# Patient Record
Sex: MALE | Race: WHITE | NOT HISPANIC OR LATINO | URBAN - METROPOLITAN AREA
[De-identification: names, ages, dates, MRNs, and addresses within clinical notes are randomized per-mention and may not be internally consistent; named-entity substitution may affect disease eponyms.]

---

## 2018-07-06 ENCOUNTER — INPATIENT (INPATIENT)
Facility: HOSPITAL | Age: 83
LOS: 4 days | Discharge: HOME | DRG: 699 | End: 2018-07-11
Attending: HOSPITALIST | Admitting: HOSPITALIST
Payer: MEDICARE

## 2018-07-06 VITALS — HEIGHT: 63 IN | WEIGHT: 175.93 LBS

## 2018-07-06 DIAGNOSIS — R33.9 RETENTION OF URINE, UNSPECIFIED: ICD-10-CM

## 2018-07-06 DIAGNOSIS — N20.0 CALCULUS OF KIDNEY: ICD-10-CM

## 2018-07-06 DIAGNOSIS — Z98.89 OTHER SPECIFIED POSTPROCEDURAL STATES: Chronic | ICD-10-CM

## 2018-07-06 DIAGNOSIS — Z90.49 ACQUIRED ABSENCE OF OTHER SPECIFIED PARTS OF DIGESTIVE TRACT: Chronic | ICD-10-CM

## 2018-07-06 DIAGNOSIS — D63.8 ANEMIA IN OTHER CHRONIC DISEASES CLASSIFIED ELSEWHERE: ICD-10-CM

## 2018-07-06 DIAGNOSIS — N13.9 OBSTRUCTIVE AND REFLUX UROPATHY, UNSPECIFIED: ICD-10-CM

## 2018-07-06 LAB
ALBUMIN SERPL ELPH-MCNC: 4.3 G/DL — SIGNIFICANT CHANGE UP (ref 3.3–5.2)
ALP SERPL-CCNC: 81 U/L — SIGNIFICANT CHANGE UP (ref 40–120)
ALT FLD-CCNC: 10 U/L — SIGNIFICANT CHANGE UP
ANION GAP SERPL CALC-SCNC: 18 MMOL/L — HIGH (ref 5–17)
APPEARANCE UR: ABNORMAL
APTT BLD: 27 SEC — LOW (ref 27.5–37.4)
AST SERPL-CCNC: 16 U/L — SIGNIFICANT CHANGE UP
BACTERIA # UR AUTO: ABNORMAL
BILIRUB SERPL-MCNC: 0.3 MG/DL — LOW (ref 0.4–2)
BILIRUB UR-MCNC: NEGATIVE — SIGNIFICANT CHANGE UP
BUN SERPL-MCNC: 47 MG/DL — HIGH (ref 8–20)
CALCIUM SERPL-MCNC: 9.2 MG/DL — SIGNIFICANT CHANGE UP (ref 8.6–10.2)
CHLORIDE SERPL-SCNC: 99 MMOL/L — SIGNIFICANT CHANGE UP (ref 98–107)
CO2 SERPL-SCNC: 18 MMOL/L — LOW (ref 22–29)
COLOR SPEC: ABNORMAL
COMMENT - URINE: SIGNIFICANT CHANGE UP
CREAT SERPL-MCNC: 1.67 MG/DL — HIGH (ref 0.5–1.3)
DIFF PNL FLD: ABNORMAL
EPI CELLS # UR: SIGNIFICANT CHANGE UP
GLUCOSE SERPL-MCNC: 130 MG/DL — HIGH (ref 70–115)
GLUCOSE UR QL: NEGATIVE MG/DL — SIGNIFICANT CHANGE UP
HCT VFR BLD CALC: 30.3 % — LOW (ref 42–52)
HGB BLD-MCNC: 10 G/DL — LOW (ref 14–18)
HYALINE CASTS # UR AUTO: ABNORMAL /LPF
INR BLD: 0.97 RATIO — SIGNIFICANT CHANGE UP (ref 0.88–1.16)
KETONES UR-MCNC: NEGATIVE — SIGNIFICANT CHANGE UP
LACTATE BLDV-MCNC: 1.6 MMOL/L — SIGNIFICANT CHANGE UP (ref 0.5–2)
LEUKOCYTE ESTERASE UR-ACNC: ABNORMAL
LYMPHOCYTES # BLD AUTO: 6 % — LOW (ref 20–55)
MAGNESIUM SERPL-MCNC: 2.4 MG/DL — SIGNIFICANT CHANGE UP (ref 1.6–2.6)
MCHC RBC-ENTMCNC: 29.8 PG — SIGNIFICANT CHANGE UP (ref 27–31)
MCHC RBC-ENTMCNC: 33 G/DL — SIGNIFICANT CHANGE UP (ref 32–36)
MCV RBC AUTO: 90.2 FL — SIGNIFICANT CHANGE UP (ref 80–94)
MONOCYTES NFR BLD AUTO: 4 % — SIGNIFICANT CHANGE UP (ref 3–10)
NEUTROPHILS NFR BLD AUTO: 90 % — HIGH (ref 37–73)
NITRITE UR-MCNC: POSITIVE
PH UR: 5 — SIGNIFICANT CHANGE UP (ref 5–8)
PLAT MORPH BLD: NORMAL — SIGNIFICANT CHANGE UP
PLATELET # BLD AUTO: 201 K/UL — SIGNIFICANT CHANGE UP (ref 150–400)
POTASSIUM SERPL-MCNC: 3.9 MMOL/L — SIGNIFICANT CHANGE UP (ref 3.5–5.3)
POTASSIUM SERPL-SCNC: 3.9 MMOL/L — SIGNIFICANT CHANGE UP (ref 3.5–5.3)
PROT SERPL-MCNC: 7.1 G/DL — SIGNIFICANT CHANGE UP (ref 6.6–8.7)
PROT UR-MCNC: 100 MG/DL
PROTHROM AB SERPL-ACNC: 10.7 SEC — SIGNIFICANT CHANGE UP (ref 9.8–12.7)
RBC # BLD: 3.36 M/UL — LOW (ref 4.6–6.2)
RBC # FLD: 12.5 % — SIGNIFICANT CHANGE UP (ref 11–15.6)
RBC BLD AUTO: NORMAL — SIGNIFICANT CHANGE UP
RBC CASTS # UR COMP ASSIST: >50 /HPF (ref 0–4)
SODIUM SERPL-SCNC: 135 MMOL/L — SIGNIFICANT CHANGE UP (ref 135–145)
SP GR SPEC: 1.01 — SIGNIFICANT CHANGE UP (ref 1.01–1.02)
TROPONIN T SERPL-MCNC: 0.05 NG/ML — SIGNIFICANT CHANGE UP (ref 0–0.06)
UROBILINOGEN FLD QL: NEGATIVE MG/DL — SIGNIFICANT CHANGE UP
WBC # BLD: 11.3 K/UL — HIGH (ref 4.8–10.8)
WBC # FLD AUTO: 11.3 K/UL — HIGH (ref 4.8–10.8)
WBC UR QL: SIGNIFICANT CHANGE UP

## 2018-07-06 PROCEDURE — 71250 CT THORAX DX C-: CPT | Mod: 26

## 2018-07-06 PROCEDURE — 74176 CT ABD & PELVIS W/O CONTRAST: CPT | Mod: 26

## 2018-07-06 PROCEDURE — 99223 1ST HOSP IP/OBS HIGH 75: CPT | Mod: AI

## 2018-07-06 PROCEDURE — 99285 EMERGENCY DEPT VISIT HI MDM: CPT

## 2018-07-06 RX ORDER — DIAZEPAM 5 MG
5 TABLET ORAL
Qty: 0 | Refills: 0 | Status: DISCONTINUED | OUTPATIENT
Start: 2018-07-06 | End: 2018-07-11

## 2018-07-06 RX ORDER — PRIMIDONE 250 MG/1
50 TABLET ORAL DAILY
Qty: 0 | Refills: 0 | Status: DISCONTINUED | OUTPATIENT
Start: 2018-07-06 | End: 2018-07-11

## 2018-07-06 RX ORDER — OXYCODONE AND ACETAMINOPHEN 5; 325 MG/1; MG/1
1 TABLET ORAL EVERY 6 HOURS
Qty: 0 | Refills: 0 | Status: DISCONTINUED | OUTPATIENT
Start: 2018-07-06 | End: 2018-07-11

## 2018-07-06 RX ORDER — OMEGA-3 ACID ETHYL ESTERS 1 G
2 CAPSULE ORAL
Qty: 0 | Refills: 0 | Status: DISCONTINUED | OUTPATIENT
Start: 2018-07-06 | End: 2018-07-11

## 2018-07-06 RX ORDER — CEFTRIAXONE 500 MG/1
1 INJECTION, POWDER, FOR SOLUTION INTRAMUSCULAR; INTRAVENOUS ONCE
Qty: 0 | Refills: 0 | Status: COMPLETED | OUTPATIENT
Start: 2018-07-06 | End: 2018-07-06

## 2018-07-06 RX ORDER — PANTOPRAZOLE SODIUM 20 MG/1
40 TABLET, DELAYED RELEASE ORAL
Qty: 0 | Refills: 0 | Status: DISCONTINUED | OUTPATIENT
Start: 2018-07-06 | End: 2018-07-11

## 2018-07-06 RX ORDER — CEFTRIAXONE 500 MG/1
1 INJECTION, POWDER, FOR SOLUTION INTRAMUSCULAR; INTRAVENOUS EVERY 24 HOURS
Qty: 0 | Refills: 0 | Status: DISCONTINUED | OUTPATIENT
Start: 2018-07-07 | End: 2018-07-11

## 2018-07-06 RX ORDER — TAMSULOSIN HYDROCHLORIDE 0.4 MG/1
0.4 CAPSULE ORAL AT BEDTIME
Qty: 0 | Refills: 0 | Status: DISCONTINUED | OUTPATIENT
Start: 2018-07-06 | End: 2018-07-11

## 2018-07-06 RX ORDER — SODIUM CHLORIDE 9 MG/ML
1000 INJECTION INTRAMUSCULAR; INTRAVENOUS; SUBCUTANEOUS
Qty: 0 | Refills: 0 | Status: COMPLETED | OUTPATIENT
Start: 2018-07-06 | End: 2018-07-06

## 2018-07-06 RX ORDER — MEMANTINE HYDROCHLORIDE 10 MG/1
10 TABLET ORAL DAILY
Qty: 0 | Refills: 0 | Status: DISCONTINUED | OUTPATIENT
Start: 2018-07-06 | End: 2018-07-11

## 2018-07-06 RX ORDER — FINASTERIDE 5 MG/1
5 TABLET, FILM COATED ORAL DAILY
Qty: 0 | Refills: 0 | Status: DISCONTINUED | OUTPATIENT
Start: 2018-07-06 | End: 2018-07-09

## 2018-07-06 RX ORDER — DONEPEZIL HYDROCHLORIDE 10 MG/1
10 TABLET, FILM COATED ORAL AT BEDTIME
Qty: 0 | Refills: 0 | Status: DISCONTINUED | OUTPATIENT
Start: 2018-07-06 | End: 2018-07-11

## 2018-07-06 RX ORDER — ZOLPIDEM TARTRATE 10 MG/1
5 TABLET ORAL AT BEDTIME
Qty: 0 | Refills: 0 | Status: DISCONTINUED | OUTPATIENT
Start: 2018-07-06 | End: 2018-07-11

## 2018-07-06 RX ORDER — ENOXAPARIN SODIUM 100 MG/ML
40 INJECTION SUBCUTANEOUS EVERY 24 HOURS
Qty: 0 | Refills: 0 | Status: DISCONTINUED | OUTPATIENT
Start: 2018-07-06 | End: 2018-07-11

## 2018-07-06 RX ADMIN — TAMSULOSIN HYDROCHLORIDE 0.4 MILLIGRAM(S): 0.4 CAPSULE ORAL at 23:00

## 2018-07-06 RX ADMIN — Medication 2 GRAM(S): at 17:33

## 2018-07-06 RX ADMIN — OXYCODONE AND ACETAMINOPHEN 1 TABLET(S): 5; 325 TABLET ORAL at 23:59

## 2018-07-06 RX ADMIN — DONEPEZIL HYDROCHLORIDE 10 MILLIGRAM(S): 10 TABLET, FILM COATED ORAL at 23:00

## 2018-07-06 RX ADMIN — OXYCODONE AND ACETAMINOPHEN 1 TABLET(S): 5; 325 TABLET ORAL at 13:20

## 2018-07-06 RX ADMIN — Medication 375 MILLIGRAM(S): at 18:03

## 2018-07-06 RX ADMIN — SODIUM CHLORIDE 125 MILLILITER(S): 9 INJECTION INTRAMUSCULAR; INTRAVENOUS; SUBCUTANEOUS at 04:27

## 2018-07-06 RX ADMIN — OXYCODONE AND ACETAMINOPHEN 1 TABLET(S): 5; 325 TABLET ORAL at 13:50

## 2018-07-06 RX ADMIN — Medication 5 MILLIGRAM(S): at 17:33

## 2018-07-06 RX ADMIN — PRIMIDONE 50 MILLIGRAM(S): 250 TABLET ORAL at 13:20

## 2018-07-06 RX ADMIN — CEFTRIAXONE 100 GRAM(S): 500 INJECTION, POWDER, FOR SOLUTION INTRAMUSCULAR; INTRAVENOUS at 07:26

## 2018-07-06 RX ADMIN — ENOXAPARIN SODIUM 40 MILLIGRAM(S): 100 INJECTION SUBCUTANEOUS at 13:20

## 2018-07-06 RX ADMIN — ZOLPIDEM TARTRATE 5 MILLIGRAM(S): 10 TABLET ORAL at 22:59

## 2018-07-06 RX ADMIN — MEMANTINE HYDROCHLORIDE 10 MILLIGRAM(S): 10 TABLET ORAL at 13:20

## 2018-07-06 RX ADMIN — OXYCODONE AND ACETAMINOPHEN 1 TABLET(S): 5; 325 TABLET ORAL at 22:59

## 2018-07-06 RX ADMIN — Medication 375 MILLIGRAM(S): at 17:33

## 2018-07-06 RX ADMIN — PANTOPRAZOLE SODIUM 40 MILLIGRAM(S): 20 TABLET, DELAYED RELEASE ORAL at 13:21

## 2018-07-06 NOTE — H&P ADULT - PROBLEM SELECTOR PLAN 4
-possibly due to renal failure  -send iron, ferritin, tibc  -can consider EPO if no iron deficiency on labs

## 2018-07-06 NOTE — H&P ADULT - HISTORY OF PRESENT ILLNESS
is an 85 y/o male with PMHx Alzheimer's disease, HTN, prostate CA presents to the ER c/o inability to urinate since last night. He reports also back pain, for which he receives medications from pain managmeent. On admission, he was found to be in acute renal failure and he has hematuria in the rob bag, after rob was placed successfully. I suspect he has urinary retention secondary to BPH and I've started flomax and finasteride. I will obtain a urology consult. He also has a UTI, for which he was started on rocephin, urine culture is pending. I'm also obtaining a CT scan of his abdomen, as he does have a hx of stones in his past, and I'm concerned about his hematuria.     Both him and his wife understand the plan of care and are in full agreement with the admission.

## 2018-07-06 NOTE — ED PROVIDER NOTE - CARE PLAN
Principal Discharge DX:	Obstructive uropathy  Secondary Diagnosis:	Urinary retention  Secondary Diagnosis:	Renal insufficiency

## 2018-07-06 NOTE — ED ADULT NURSE NOTE - OBJECTIVE STATEMENT
patient states can not urinate, abdomen distended, has not urinated since 2100, pain, retaining urine

## 2018-07-06 NOTE — ED PROVIDER NOTE - PMH
Alzheimers disease    [place] (hard of hearing)    HTN (hypertension)    Hypertension    Kidney stone    Kidney stones    Prostate cancer    Sleep apnea

## 2018-07-06 NOTE — H&P ADULT - NSHPPHYSICALEXAM_GEN_ALL_CORE
Gen: NAD, comfortable, speaking full sentences, awake, alert, lying back in bed, pleasant  HEENT: dry MM  CVS: s1S2 RRR  PULM: good breath sounds  ABD: soft, nontender, no cystitis, no rebound, no guarding  EXTREM: no edema  NEURO: intact  PSYCH: pleasant  : rob in place, draining reddish-tinged urine

## 2018-07-06 NOTE — ED ADULT NURSE NOTE - PMH
Alzheimers disease    [place] (hard of hearing)    HTN (hypertension)    Hypertension    Kidney stone    Kidney stones    Prostate cancer    Sleep apnea

## 2018-07-06 NOTE — ED PROVIDER NOTE - OBJECTIVE STATEMENT
85 y/o male with PMHx Alzheimer's disease, HTN, prostate CA presents to the ED with c/o urinary retention, onset 2300 yesterday. Pt reports back pain, however, pt sees pain management for back pain. Per pt received steroid shots, last received 2 weeks ago. Pt denies fever, chills, n/v, and any other acute symptoms and complaints at this time.  Nephrologist: Dr. [facility]

## 2018-07-06 NOTE — ED PROVIDER NOTE - PHYSICAL EXAMINATION
Constitutional : Appears comfortably, talking in full sentences  Head :NC AT , no swelling  Eyes :eomi, no swelling  Ears: hearing aids in bilatearl ears   Mouth :mm moist,  Neck : supple, trachea in midline  Chest :[place] air entry, symm chest expansion, no distress  Heart :S1 S2 distant  Abdomen :abd soft, non tender  Genitalia no swelling, post Munroe noted, more than 500cc of dark colored urine  Musc/Skel :ext no swelling, no deformity, no spine tenderness, distal pulses present  Neuro  :AAO 3 no focal deficits

## 2018-07-06 NOTE — H&P ADULT - PMH
Alzheimers disease    [place] (hard of hearing)    HTN (hypertension)    Hypertension    Kidney stone    Kidney stones    Prostate cancer    Sleep apnea

## 2018-07-06 NOTE — ED PROVIDER NOTE - NS ED ROS FT
no weight change, no fever or chills  no rash, no bruises  no visual changes no eye discharge  no cough cold or congestion,   no sob, no chest pain  no orthopnea, no pnd  no abd pain, no n/v/d  no hematuria, +change in urinary habits  +back pain, no joint pain, no deformity  no headache, no paresthesia

## 2018-07-06 NOTE — ED PROVIDER NOTE - PSH
H/O neck surgery    History of appendectomy    History of appendectomy    History of back surgery    History of back surgery  cervical surgery 1992  History of back surgery  2012  History of cholecystectomy

## 2018-07-07 DIAGNOSIS — N18.3 CHRONIC KIDNEY DISEASE, STAGE 3 (MODERATE): ICD-10-CM

## 2018-07-07 DIAGNOSIS — E11.65 TYPE 2 DIABETES MELLITUS WITH HYPERGLYCEMIA: ICD-10-CM

## 2018-07-07 LAB
ANION GAP SERPL CALC-SCNC: 18 MMOL/L — HIGH (ref 5–17)
BUN SERPL-MCNC: 43 MG/DL — HIGH (ref 8–20)
CALCIUM SERPL-MCNC: 9.3 MG/DL — SIGNIFICANT CHANGE UP (ref 8.6–10.2)
CHLORIDE SERPL-SCNC: 102 MMOL/L — SIGNIFICANT CHANGE UP (ref 98–107)
CO2 SERPL-SCNC: 20 MMOL/L — LOW (ref 22–29)
CREAT SERPL-MCNC: 1.71 MG/DL — HIGH (ref 0.5–1.3)
CULTURE RESULTS: NO GROWTH — SIGNIFICANT CHANGE UP
FERRITIN SERPL-MCNC: 624 NG/ML — HIGH (ref 30–400)
GLUCOSE BLDC GLUCOMTR-MCNC: 97 MG/DL — SIGNIFICANT CHANGE UP (ref 70–99)
GLUCOSE SERPL-MCNC: 229 MG/DL — HIGH (ref 70–115)
HCT VFR BLD CALC: 33.2 % — LOW (ref 42–52)
HGB BLD-MCNC: 10.7 G/DL — LOW (ref 14–18)
IRON SATN MFR SERPL: 27 % — SIGNIFICANT CHANGE UP (ref 16–55)
IRON SATN MFR SERPL: 85 UG/DL — SIGNIFICANT CHANGE UP (ref 59–158)
MAGNESIUM SERPL-MCNC: 2.2 MG/DL — SIGNIFICANT CHANGE UP (ref 1.6–2.6)
MCHC RBC-ENTMCNC: 29.6 PG — SIGNIFICANT CHANGE UP (ref 27–31)
MCHC RBC-ENTMCNC: 32.2 G/DL — SIGNIFICANT CHANGE UP (ref 32–36)
MCV RBC AUTO: 91.7 FL — SIGNIFICANT CHANGE UP (ref 80–94)
PHOSPHATE SERPL-MCNC: 3 MG/DL — SIGNIFICANT CHANGE UP (ref 2.4–4.7)
PLATELET # BLD AUTO: 204 K/UL — SIGNIFICANT CHANGE UP (ref 150–400)
POTASSIUM SERPL-MCNC: 3.9 MMOL/L — SIGNIFICANT CHANGE UP (ref 3.5–5.3)
POTASSIUM SERPL-SCNC: 3.9 MMOL/L — SIGNIFICANT CHANGE UP (ref 3.5–5.3)
RBC # BLD: 3.62 M/UL — LOW (ref 4.6–6.2)
RBC # FLD: 12.7 % — SIGNIFICANT CHANGE UP (ref 11–15.6)
SODIUM SERPL-SCNC: 140 MMOL/L — SIGNIFICANT CHANGE UP (ref 135–145)
SPECIMEN SOURCE: SIGNIFICANT CHANGE UP
TIBC SERPL-MCNC: 313 UG/DL — SIGNIFICANT CHANGE UP (ref 220–430)
TRANSFERRIN SERPL-MCNC: 219 MG/DL — SIGNIFICANT CHANGE UP (ref 180–329)
WBC # BLD: 6.8 K/UL — SIGNIFICANT CHANGE UP (ref 4.8–10.8)
WBC # FLD AUTO: 6.8 K/UL — SIGNIFICANT CHANGE UP (ref 4.8–10.8)

## 2018-07-07 PROCEDURE — 99232 SBSQ HOSP IP/OBS MODERATE 35: CPT

## 2018-07-07 PROCEDURE — 99222 1ST HOSP IP/OBS MODERATE 55: CPT

## 2018-07-07 RX ORDER — DEXTROSE 50 % IN WATER 50 %
25 SYRINGE (ML) INTRAVENOUS ONCE
Qty: 0 | Refills: 0 | Status: DISCONTINUED | OUTPATIENT
Start: 2018-07-07 | End: 2018-07-08

## 2018-07-07 RX ORDER — DEXTROSE 50 % IN WATER 50 %
15 SYRINGE (ML) INTRAVENOUS ONCE
Qty: 0 | Refills: 0 | Status: DISCONTINUED | OUTPATIENT
Start: 2018-07-07 | End: 2018-07-08

## 2018-07-07 RX ORDER — DEXTROSE 50 % IN WATER 50 %
12.5 SYRINGE (ML) INTRAVENOUS ONCE
Qty: 0 | Refills: 0 | Status: DISCONTINUED | OUTPATIENT
Start: 2018-07-07 | End: 2018-07-08

## 2018-07-07 RX ORDER — INSULIN LISPRO 100/ML
VIAL (ML) SUBCUTANEOUS
Qty: 0 | Refills: 0 | Status: DISCONTINUED | OUTPATIENT
Start: 2018-07-07 | End: 2018-07-08

## 2018-07-07 RX ORDER — GLUCAGON INJECTION, SOLUTION 0.5 MG/.1ML
1 INJECTION, SOLUTION SUBCUTANEOUS ONCE
Qty: 0 | Refills: 0 | Status: DISCONTINUED | OUTPATIENT
Start: 2018-07-07 | End: 2018-07-08

## 2018-07-07 RX ORDER — SODIUM CHLORIDE 9 MG/ML
1000 INJECTION, SOLUTION INTRAVENOUS
Qty: 0 | Refills: 0 | Status: DISCONTINUED | OUTPATIENT
Start: 2018-07-07 | End: 2018-07-08

## 2018-07-07 RX ADMIN — Medication 2 GRAM(S): at 17:37

## 2018-07-07 RX ADMIN — MEMANTINE HYDROCHLORIDE 10 MILLIGRAM(S): 10 TABLET ORAL at 11:24

## 2018-07-07 RX ADMIN — PANTOPRAZOLE SODIUM 40 MILLIGRAM(S): 20 TABLET, DELAYED RELEASE ORAL at 04:51

## 2018-07-07 RX ADMIN — Medication 2 GRAM(S): at 04:51

## 2018-07-07 RX ADMIN — CEFTRIAXONE 100 GRAM(S): 500 INJECTION, POWDER, FOR SOLUTION INTRAMUSCULAR; INTRAVENOUS at 05:45

## 2018-07-07 RX ADMIN — Medication 375 MILLIGRAM(S): at 17:37

## 2018-07-07 RX ADMIN — Medication 375 MILLIGRAM(S): at 18:07

## 2018-07-07 RX ADMIN — Medication 5 MILLIGRAM(S): at 04:52

## 2018-07-07 RX ADMIN — ENOXAPARIN SODIUM 40 MILLIGRAM(S): 100 INJECTION SUBCUTANEOUS at 12:54

## 2018-07-07 RX ADMIN — Medication 375 MILLIGRAM(S): at 05:51

## 2018-07-07 RX ADMIN — FINASTERIDE 5 MILLIGRAM(S): 5 TABLET, FILM COATED ORAL at 11:24

## 2018-07-07 RX ADMIN — Medication 375 MILLIGRAM(S): at 04:51

## 2018-07-07 RX ADMIN — PRIMIDONE 50 MILLIGRAM(S): 250 TABLET ORAL at 11:24

## 2018-07-07 RX ADMIN — TAMSULOSIN HYDROCHLORIDE 0.4 MILLIGRAM(S): 0.4 CAPSULE ORAL at 22:56

## 2018-07-07 RX ADMIN — Medication 5 MILLIGRAM(S): at 17:37

## 2018-07-07 RX ADMIN — ZOLPIDEM TARTRATE 5 MILLIGRAM(S): 10 TABLET ORAL at 22:56

## 2018-07-07 RX ADMIN — DONEPEZIL HYDROCHLORIDE 10 MILLIGRAM(S): 10 TABLET, FILM COATED ORAL at 22:56

## 2018-07-07 NOTE — CONSULT NOTE ADULT - SUBJECTIVE AND OBJECTIVE BOX
Patient is a 86y old  Male who presents with a chief complaint of "I was having difficulty urinating at home." (2018 14:05)     THE PATIENT HAS A PRIVATE UROLOGIST BUT CANNOT REMEMBER THE NAME.      HPI:   is an 87 y/o male with PMHx Alzheimer's disease, HTN, prostate CA presents to the ER c/o inability to urinate since last night. He reports also back pain, for which he receives medications from pain managmeent. On admission, he was found to be in acute renal failure and he has hematuria in the rob bag, after rob was placed successfully. I suspect he has urinary retention secondary to BPH and I've started flomax and finasteride. I will obtain a urology consult. He also has a UTI, for which he was started on rocephin, urine culture is pending. I'm also obtaining a CT scan of his abdomen, as he does have a hx of stones in his past, and I'm concerned about his hematuria.     Both him and his wife understand the plan of care and are in full agreement with the admission. (2018 14:05)     HPI :   He started having difficulty urinating a few days ago & the came to the ER because of marked frequency & more difficulty starting the stream with decreased force. A rob was inserted in ER & the volume that immediately drained is not recorded. It was initially hematuric. He was stated on AB for UTI.  He reports gross hematuria that occurred a few weeks ago wo associated LUTS or pain. He did not see his urologist for this.  His usual DTF is 4X/day wo nocturia & he denies all other CU & constitutional Sx's.  Upon further questioning, it was revealed that he has been seeing a pain mgt MD who started him on percocet 2 weeks ago for back pain.    The rob was removed & he reports no difficulties & that there is no gross blood in the urine.    Past Gu Hx :   2012 - RT for CaP- pt & wife don't know name of urologist  Nephrolithiasis  BPH-not treated       PAST MEDICAL & SURGICAL HISTORY:  Kidney stones  HTN (hypertension)  Kidney stone  Prostate cancer  [place] (hard of hearing)  Sleep apnea  Hypertension  Alzheimers disease  History of cholecystectomy  H/O neck surgery  History of back surgery  History of appendectomy  History of back surgery: cervical surgery   History of back surgery: 2012  History of appendectomy      REVIEW OF SYSTEMS:    Constitutional: No fever, weight loss or fatigue  Eyes: No eye pain, visual disturbances, or discharge  ENMT:  No difficulty hearing, tinnitus, vertigo; No sinus or throat pain  Respiratory: No cough, wheezing, chills or hemoptysis  Cardiovascular: No chest pain, palpitations, shortness of breath, dizziness or leg swelling  Gastrointestinal: No abdominal or epigastric pain. No nausea, vomiting or hematemesis; No diarrhea or constipation. No melena or hematochezia.  Genitourinary: No dysuria, frequency, hematuria or incontinence  Rectal: No pain, hemorrhoids or incontinence  Neurological: No headaches, memory loss, loss of strength, numbness or tremors  Skin: No itching, burning, rashes or lesions   Lymph Nodes: No enlarged glands  Musculoskeletal: No joint pain or swelling; No muscle, back or extremity pain  Psychiatric: No depression, anxiety, mood swings or difficulty sleeping  Heme/Lymph: No easy bruising or bleeding gums  Allergy and Immunologic: No hives or eczema    MEDICATIONS  (STANDING):  cefTRIAXone   IVPB 1 Gram(s) IV Intermittent every 24 hours  dextrose 5%. 1000 milliLiter(s) (50 mL/Hr) IV Continuous <Continuous>  dextrose 50% Injectable 12.5 Gram(s) IV Push once  dextrose 50% Injectable 25 Gram(s) IV Push once  dextrose 50% Injectable 25 Gram(s) IV Push once  diazepam    Tablet 5 milliGRAM(s) Oral two times a day  donepezil 10 milliGRAM(s) Oral at bedtime  enoxaparin Injectable 40 milliGRAM(s) SubCutaneous every 24 hours  finasteride 5 milliGRAM(s) Oral daily  insulin lispro (HumaLOG) corrective regimen sliding scale   SubCutaneous three times a day before meals  memantine 10 milliGRAM(s) Oral daily  naproxen 375 milliGRAM(s) Oral two times a day  omega-3-Acid Ethyl Esters 2 Gram(s) Oral two times a day  pantoprazole    Tablet 40 milliGRAM(s) Oral before breakfast  primidone 50 milliGRAM(s) Oral daily  tamsulosin 0.4 milliGRAM(s) Oral at bedtime  zolpidem 5 milliGRAM(s) Oral at bedtime    MEDICATIONS  (PRN):  dextrose 40% Gel 15 Gram(s) Oral once PRN Blood Glucose LESS THAN 70 milliGRAM(s)/deciliter  glucagon  Injectable 1 milliGRAM(s) IntraMuscular once PRN Glucose LESS THAN 70 milligrams/deciliter  oxyCODONE    5 mG/acetaminophen 325 mG 1 Tablet(s) Oral every 6 hours PRN Moderate Pain (4 - 6)      Allergies    Allergy Status Unknown  No Known Allergies    Intolerances        SOCIAL HISTORY:    FAMILY HISTORY:  No pertinent family history in first degree relatives      Vital Signs Last 24 Hrs  T(C): 36.7 (2018 15:16), Max: 36.9 (2018 20:31)  T(F): 98.1 (2018 15:16), Max: 98.4 (2018 20:31)  HR: 55 (2018 15:16) (55 - 64)  BP: 127/62 (2018 15:16) (98/46 - 141/62)  BP(mean): --  RR: 18 (2018 15:16) (15 - 18)  SpO2: 98% (2018 15:16) (94% - 98%)    PHYSICAL EXAM:    General: Well developed; well nourished; in no acute distress  Head: Normocephalic; atraumatic  Respiratory: No wheezes, rales or rhonchi  Cardiovascular: Regular rate and rhythm. S1 and S2 Normal; No murmurs, gallops or rubs  Gastrointestinal: Soft non-tender non-distended; Normal bowel sounds; No hepatosplenomegaly; no masses  Genitourinary: No costovertebral angle tenderness.  Urinary bladder is clinically not distended  Penis : uncirc wo lesions; meatus nl  Scrotum : nl  Testes : no masses or tenderness  Extremities: No clubbing, cyanosis or edema  Vascular: femoral pulses palpable 1/ 2+ bilaterally  Neurological: Alert and awake  Skin: Warm and dry. No acute rash  Musculoskeletal: Normal tone, without deformities  Psychiatric: Cooperative and appropriate      LABS:                        10.7   6.8   )-----------( 204      ( 2018 09:37 )             33.2     -    140  |  102  |  43.0<H>  ----------------------------<  229<H>  3.9   |  20.0<L>  |  1.71<H>    Ca    9.3      2018 09:37  Phos  3.0     07-  Mg     2.2     -    TPro  7.1  /  Alb  4.3  /  TBili  0.3<L>  /  DBili  x   /  AST  16  /  ALT  10  /  AlkPhos  81  -    PT/INR - ( 2018 04:12 )   PT: 10.7 sec;   INR: 0.97 ratio         PTT - ( 2018 04:12 )  PTT:27.0 sec  Urinalysis Basic - ( 2018 03:35 )    Color: Brown / Appearance: Slightly Turbid / S.015 / pH: x  Gluc: x / Ketone: Negative  / Bili: Negative / Urobili: Negative mg/dL   Blood: x / Protein: 100 mg/dL / Nitrite: Positive   Leuk Esterase: Trace / RBC: >50 /HPF / WBC 3-5   Sq Epi: x / Non Sq Epi: Few / Bacteria: Many      Culture - Urine (18 @ 04:13)    Specimen Source: .Urine Clean Catch (Midstream)    Culture Results:   No growth        RADIOLOGY & ADDITIONAL STUDIES:       EXAM:  CT ABDOMEN AND PELVIS                          PROCEDURE DATE:  2018          INTERPRETATION:  CT OF THE ABDOMEN AND PELVIS DATED 2018.    CLINICAL INFORMATION:  Urinary retention.    TECHNIQUE: Axial CT images are obtained from the lung bases through the   pubic symphysis without the administration of oral or intravenous   contrast.. Images are reformatted in the sagittal and coronal planes.    The study is correlated with a prior examination from 2016.    FINDINGS:    The lung bases are clear. There are coronary artery atherosclerotic   calcifications.    The unenhanced liver is unremarkable. There are cholecystectomy clips.   There is no biliary ductal dilatation. The unenhanced pancreas, spleen,   and adrenal glands are unremarkable. There is no right or left   hydronephrosis focal dilatation of the distal left ureter is unchanged   compared to the prior exam. There is no obstructing urinary tract   calculus. There is no nephrolithiasis. There is no asymmetric perinephric   stranding. There is a lower pole right renal cyst and hyperdense   proteinaceous/hemorrhagic cyst arising from the left kidney measuring 1.9   cm. A Rob catheter partially decompresses the bladder. There is mild   bladder wall thickening despite underdistention with mild infiltration of   the perivesicular fat which raises question of cystitis. There is high   density debris layering posteriorly in the bladder likely representing a   small amount of blood products. There is mild median hypertrophy of the   prostate gland. There are fiducial markers in the prostate gland.    There is no bowel obstruction, pneumoperitoneum, or ascites. The appendix   is not discretely identified.    There is no significant abdominal or pelvic lymphadenopathy. There is   atherosclerotic calcification of the aortoiliac tree and abdominal aortic   branches. The abdominal aorta is normal in caliber.    There are small fat-containing bilateral inguinal hernias.    There are degenerative changes ofthe spine and lumbar fusion at L4-L5   with total laminectomies at L4 and L5.    IMPRESSION:    No obstructing urinary tract calculus or hydronephrosis. Focal dilatation   of the distal left ureter, unchanged compared with prior exam. Rob   catheter partially decompressing the bladder. Mild thickening of the   urinary bladder walls despite underdistention with mild infiltration of   the perivesicular fat raises question of cystitis. High density debris   layering posteriorly in the bladder likely representing a small amount of   blood products.                  LENARD LIANG D.O.; ATTENDING RADIOLOGIST  This document has been electronically signed. 2018  5:25AM

## 2018-07-07 NOTE — CONSULT NOTE ADULT - ASSESSMENT
1) Urinary retention  Most likely due to a combination of percocet, valium & BPH  Continue flomax & proscar.  Pt counseled about avoiding percocet & valium, if possible; otherwise, he will need a rob  May be D/C'd.    2) Gross hematuria 2 weeks ago  May be from RT for CaP  He & his wife were told he must see his urologist after D/C so a cystoscopy can be arranged.    Thank you.

## 2018-07-07 NOTE — PROVIDER CONTACT NOTE (EICU) - RECOMMENDATIONS
Dr. Nicolas recommended, new rob be inserted after 6 hour trial of rob being removed with increase PO fluids encouraged.

## 2018-07-07 NOTE — CHART NOTE - NSCHARTNOTEFT_GEN_A_CORE
Aware pts family with questions about meal plan.  As per daughter, pt is newly diagnosed diabetes this admission- hgba1c ordered- results not yet available.  Pts daughter extremely concerned and with many questions about diabetes.  Reviewed cons cho meal plan using the plate method with pts wife and daughter- discussed meal timing and portion sizes, with examples, as per their request.  Pts daughter continues with many questions about what medications pt should be receiving to treat diabetes.  Advised daughter to wait for a1c results and plan will be determined at that point.  Will initiate further diabetes self management as needed when a1c result is available.  Pts daughter agreeable and seemed to calm down after discussion. RD CDE to follow up prn.

## 2018-07-07 NOTE — PROGRESS NOTE ADULT - SUBJECTIVE AND OBJECTIVE BOX
is an 85 y/o male with PMHx Alzheimer's disease, HTN, prostate CA who presented with acute renal failure and inability to urinate. He received a rob placement in the ER and he had some "hematuria." However, we did a CT of the abdomen demonstrating no stones, no bilateral hydronephrosis. We started flomax and finasteride for presumed BPH yesterday. Today, his rob was leaking urine and I told the nurse to remove it and give him a voiding trial, which he has now passed.    I suspect that his acute renal failure is from chronic kidney disease secondary to a diabetic nephropathy. He did not have a hx of diabetes but after checking with his daughter, he was just told by his outpatient nephrologist that he does. I have started a sliding scale and given that the creatinine has not decreased since urinary obstruction has been relieved, I suspect that he does indeed have diabetic nephropathy with a mild component of BPH and he does not require a rob.    I am treating his UTI, he can most likely be discharged home tomm. with urology f/u for BPH and I have called in a urology consult with the PA service.    Summary:   REVIEW OF SYSTEMS    General:	"I'm doing fine."    Skin/Breast:  	  Ophthalmologic:  	  ENMT:	    Respiratory and Thorax:  	  Cardiovascular:	    Gastrointestinal:	    Genitourinary:	    Musculoskeletal:	    Neurological:	    Psychiatric:	    Hematology/Lymphatics:	    Endocrine:	    Allergic/Immunologic:	  Vital Signs Last 24 Hrs  T(C): 36.3 (07 Jul 2018 08:39), Max: 36.9 (06 Jul 2018 16:05)  T(F): 97.3 (07 Jul 2018 08:39), Max: 98.4 (06 Jul 2018 16:05)  HR: 64 (07 Jul 2018 08:39) (60 - 64)  BP: 120/52 (07 Jul 2018 08:39) (98/46 - 141/62)  BP(mean): --  RR: 17 (07 Jul 2018 08:39) (15 - 18)  SpO2: 96% (07 Jul 2018 08:39) (94% - 98%)  Physical Exam:   Gen: NAD, comfortable, speaking full sentences, awake, alert, standing up and walking around  HEENT: dry MM  CVS: s1S2 RRR  PULM: good breath sounds  ABD: soft, nontender, no cystitis, no rebound, no guarding  EXTREM: no edema  NEURO: intact  PSYCH: pleasant  : urinating by himself, rob is out, urine is clear (no more red-tinge)                        10.7   6.8   )-----------( 204      ( 07 Jul 2018 09:37 )             33.2     07-07    140  |  102  |  43.0<H>  ----------------------------<  229<H>  3.9   |  20.0<L>  |  1.71<H>    Ca    9.3      07 Jul 2018 09:37  Phos  3.0     07-07  Mg     2.2     07-07    TPro  7.1  /  Alb  4.3  /  TBili  0.3<L>  /  DBili  x   /  AST  16  /  ALT  10  /  AlkPhos  81  07-06    LIVER FUNCTIONS - ( 06 Jul 2018 04:12 )  Alb: 4.3 g/dL / Pro: 7.1 g/dL / ALK PHOS: 81 U/L / ALT: 10 U/L / AST: 16 U/L / GGT: x           PT/INR - ( 06 Jul 2018 04:12 )   PT: 10.7 sec;   INR: 0.97 ratio         PTT - ( 06 Jul 2018 04:12 )  PTT:27.0 sec    Radiology:     MEDICATIONS  (STANDING):  cefTRIAXone   IVPB 1 Gram(s) IV Intermittent every 24 hours  dextrose 5%. 1000 milliLiter(s) (50 mL/Hr) IV Continuous <Continuous>  dextrose 50% Injectable 12.5 Gram(s) IV Push once  dextrose 50% Injectable 25 Gram(s) IV Push once  dextrose 50% Injectable 25 Gram(s) IV Push once  diazepam    Tablet 5 milliGRAM(s) Oral two times a day  donepezil 10 milliGRAM(s) Oral at bedtime  enoxaparin Injectable 40 milliGRAM(s) SubCutaneous every 24 hours  finasteride 5 milliGRAM(s) Oral daily  insulin lispro (HumaLOG) corrective regimen sliding scale   SubCutaneous three times a day before meals  memantine 10 milliGRAM(s) Oral daily  naproxen 375 milliGRAM(s) Oral two times a day  omega-3-Acid Ethyl Esters 2 Gram(s) Oral two times a day  pantoprazole    Tablet 40 milliGRAM(s) Oral before breakfast  primidone 50 milliGRAM(s) Oral daily  tamsulosin 0.4 milliGRAM(s) Oral at bedtime  zolpidem 5 milliGRAM(s) Oral at bedtime    MEDICATIONS  (PRN):  dextrose 40% Gel 15 Gram(s) Oral once PRN Blood Glucose LESS THAN 70 milliGRAM(s)/deciliter  glucagon  Injectable 1 milliGRAM(s) IntraMuscular once PRN Glucose LESS THAN 70 milligrams/deciliter  oxyCODONE    5 mG/acetaminophen 325 mG 1 Tablet(s) Oral every 6 hours PRN Moderate Pain (4 - 6)

## 2018-07-08 LAB
GLUCOSE BLDC GLUCOMTR-MCNC: 106 MG/DL — HIGH (ref 70–99)
HBA1C BLD-MCNC: 4.8 % — SIGNIFICANT CHANGE UP (ref 4–5.6)
HCT VFR BLD CALC: 31.7 % — LOW (ref 42–52)
HGB BLD-MCNC: 10.5 G/DL — LOW (ref 14–18)
MCHC RBC-ENTMCNC: 30.3 PG — SIGNIFICANT CHANGE UP (ref 27–31)
MCHC RBC-ENTMCNC: 33.1 G/DL — SIGNIFICANT CHANGE UP (ref 32–36)
MCV RBC AUTO: 91.6 FL — SIGNIFICANT CHANGE UP (ref 80–94)
PLATELET # BLD AUTO: 206 K/UL — SIGNIFICANT CHANGE UP (ref 150–400)
RBC # BLD: 3.46 M/UL — LOW (ref 4.6–6.2)
RBC # FLD: 12.7 % — SIGNIFICANT CHANGE UP (ref 11–15.6)
WBC # BLD: 6.6 K/UL — SIGNIFICANT CHANGE UP (ref 4.8–10.8)
WBC # FLD AUTO: 6.6 K/UL — SIGNIFICANT CHANGE UP (ref 4.8–10.8)

## 2018-07-08 PROCEDURE — 51703 INSERT BLADDER CATH COMPLEX: CPT

## 2018-07-08 PROCEDURE — 99233 SBSQ HOSP IP/OBS HIGH 50: CPT

## 2018-07-08 RX ORDER — SENNA PLUS 8.6 MG/1
2 TABLET ORAL AT BEDTIME
Qty: 0 | Refills: 0 | Status: DISCONTINUED | OUTPATIENT
Start: 2018-07-08 | End: 2018-07-11

## 2018-07-08 RX ORDER — DOCUSATE SODIUM 100 MG
100 CAPSULE ORAL
Qty: 0 | Refills: 0 | Status: DISCONTINUED | OUTPATIENT
Start: 2018-07-08 | End: 2018-07-11

## 2018-07-08 RX ORDER — SACCHAROMYCES BOULARDII 250 MG
250 POWDER IN PACKET (EA) ORAL
Qty: 0 | Refills: 0 | Status: DISCONTINUED | OUTPATIENT
Start: 2018-07-08 | End: 2018-07-11

## 2018-07-08 RX ADMIN — CEFTRIAXONE 100 GRAM(S): 500 INJECTION, POWDER, FOR SOLUTION INTRAMUSCULAR; INTRAVENOUS at 05:47

## 2018-07-08 RX ADMIN — DONEPEZIL HYDROCHLORIDE 10 MILLIGRAM(S): 10 TABLET, FILM COATED ORAL at 22:47

## 2018-07-08 RX ADMIN — Medication 2 GRAM(S): at 05:48

## 2018-07-08 RX ADMIN — TAMSULOSIN HYDROCHLORIDE 0.4 MILLIGRAM(S): 0.4 CAPSULE ORAL at 22:47

## 2018-07-08 RX ADMIN — SENNA PLUS 2 TABLET(S): 8.6 TABLET ORAL at 22:47

## 2018-07-08 RX ADMIN — Medication 100 MILLIGRAM(S): at 17:28

## 2018-07-08 RX ADMIN — Medication 2 GRAM(S): at 17:28

## 2018-07-08 RX ADMIN — ZOLPIDEM TARTRATE 5 MILLIGRAM(S): 10 TABLET ORAL at 22:47

## 2018-07-08 RX ADMIN — FINASTERIDE 5 MILLIGRAM(S): 5 TABLET, FILM COATED ORAL at 11:52

## 2018-07-08 RX ADMIN — PANTOPRAZOLE SODIUM 40 MILLIGRAM(S): 20 TABLET, DELAYED RELEASE ORAL at 05:48

## 2018-07-08 RX ADMIN — Medication 5 MILLIGRAM(S): at 05:47

## 2018-07-08 RX ADMIN — Medication 375 MILLIGRAM(S): at 05:48

## 2018-07-08 RX ADMIN — MEMANTINE HYDROCHLORIDE 10 MILLIGRAM(S): 10 TABLET ORAL at 11:52

## 2018-07-08 RX ADMIN — Medication 375 MILLIGRAM(S): at 17:28

## 2018-07-08 RX ADMIN — PRIMIDONE 50 MILLIGRAM(S): 250 TABLET ORAL at 11:52

## 2018-07-08 RX ADMIN — ENOXAPARIN SODIUM 40 MILLIGRAM(S): 100 INJECTION SUBCUTANEOUS at 12:52

## 2018-07-08 RX ADMIN — Medication 375 MILLIGRAM(S): at 06:00

## 2018-07-08 RX ADMIN — Medication 250 MILLIGRAM(S): at 17:27

## 2018-07-08 RX ADMIN — Medication 5 MILLIGRAM(S): at 17:28

## 2018-07-08 NOTE — PROVIDER CONTACT NOTE (OTHER) - SITUATION
Patient admitted with obstructive and reflux uropathy, history of kidney stones, prostate cancer and anemia.

## 2018-07-08 NOTE — PROGRESS NOTE ADULT - SUBJECTIVE AND OBJECTIVE BOX
is an 85 y/o male with PMHx Alzheimer's disease, HTN, prostate CA who presented with acute renal failure and urinary retention. He is now urinating but still passing clots and having hematuria.     We repeated a cbc today, it's pending. WE are treating his UTI with rocephin and monitoring his hematuria, he has no difficulty urinating and he's not having pain. His CT ruled out stones. I spoke to his wife at the bedside and his daughter from [place]. I'm waiting for his daughter here in [place] to call me back, Sil.     Summary:   REVIEW OF SYSTEMS    General:	"I'm doing fine."    Skin/Breast:  	  Ophthalmologic:  	  ENMT:	    Respiratory and Thorax:  	  Cardiovascular:	    Gastrointestinal:	    Genitourinary:	    Musculoskeletal:	    Neurological:	    Psychiatric:	    Hematology/Lymphatics:	    Endocrine:	    Allergic/Immunologic:	    Vital Signs Last 24 Hrs  T(C): 36.3 (07 Jul 2018 08:39), Max: 36.9 (06 Jul 2018 16:05)  T(F): 97.3 (07 Jul 2018 08:39), Max: 98.4 (06 Jul 2018 16:05)  HR: 64 (07 Jul 2018 08:39) (60 - 64)  BP: 120/52 (07 Jul 2018 08:39) (98/46 - 141/62)  BP(mean): --  RR: 17 (07 Jul 2018 08:39) (15 - 18)  SpO2: 96% (07 Jul 2018 08:39) (94% - 98%)  Physical Exam:   Gen: NAD, comfortable, speaking full sentences, awake, alert, resting in bed  HEENT: dry MM  CVS: s1S2 RRR  PULM: good breath sounds  ABD: soft, nontender, no cystitis, no rebound, no guarding  EXTREM: no edema  NEURO: intact  PSYCH: pleasant  : urinating by himself, rob is out, urine is clear (no more red-tinge)                                 10.7   6.8   )-----------( 204      ( 07 Jul 2018 09:37 )             33.2     07-07    140  |  102  |  43.0<H>  ----------------------------<  229<H>  3.9   |  20.0<L>  |  1.71<H>    Ca    9.3      07 Jul 2018 09:37  Phos  3.0     07-07  Mg     2.2     07-07    Radiology:     MEDICATIONS  (STANDING):  cefTRIAXone   IVPB 1 Gram(s) IV Intermittent every 24 hours  diazepam    Tablet 5 milliGRAM(s) Oral two times a day  docusate sodium 100 milliGRAM(s) Oral two times a day  donepezil 10 milliGRAM(s) Oral at bedtime  enoxaparin Injectable 40 milliGRAM(s) SubCutaneous every 24 hours  finasteride 5 milliGRAM(s) Oral daily  memantine 10 milliGRAM(s) Oral daily  naproxen 375 milliGRAM(s) Oral two times a day  omega-3-Acid Ethyl Esters 2 Gram(s) Oral two times a day  pantoprazole    Tablet 40 milliGRAM(s) Oral before breakfast  primidone 50 milliGRAM(s) Oral daily  saccharomyces boulardii 250 milliGRAM(s) Oral two times a day  senna 2 Tablet(s) Oral at bedtime  tamsulosin 0.4 milliGRAM(s) Oral at bedtime  zolpidem 5 milliGRAM(s) Oral at bedtime    MEDICATIONS  (PRN):  oxyCODONE    5 mG/acetaminophen 325 mG 1 Tablet(s) Oral every 6 hours PRN Moderate Pain (4 - 6)

## 2018-07-08 NOTE — PROCEDURE NOTE - NSTIMEOUT_GEN_A_CORE
Patient's first and last name, , procedure, and correct site confirmed prior to the start of procedure.
No

## 2018-07-08 NOTE — PROCEDURE NOTE - NSURITECHNIQUE_GU_A_CORE
The site was cleaned with soap/water and sterile solution (betadine)/The catheter was secured with a securement device (e.g. StatLock)/Sterile gloves were worn for the duration of the procedure/The urinary drainage system is closed at the end of the procedure/Proper hand hygiene was performed/A sterile drape was used to cover all adjacent areas/The catheter was appropriately lubricated/The collection bag is below the level of the patient and urinary bladder

## 2018-07-08 NOTE — PROCEDURE NOTE - PROCEDURE
<<-----Click on this checkbox to enter Procedure Munroe catheter to gravity drainage  07/08/2018    Active  SAMIUJA

## 2018-07-09 DIAGNOSIS — R31.0 GROSS HEMATURIA: ICD-10-CM

## 2018-07-09 LAB
ANION GAP SERPL CALC-SCNC: 14 MMOL/L — SIGNIFICANT CHANGE UP (ref 5–17)
BLD GP AB SCN SERPL QL: SIGNIFICANT CHANGE UP
BUN SERPL-MCNC: 33 MG/DL — HIGH (ref 8–20)
CALCIUM SERPL-MCNC: 9.4 MG/DL — SIGNIFICANT CHANGE UP (ref 8.6–10.2)
CHLORIDE SERPL-SCNC: 101 MMOL/L — SIGNIFICANT CHANGE UP (ref 98–107)
CO2 SERPL-SCNC: 23 MMOL/L — SIGNIFICANT CHANGE UP (ref 22–29)
CREAT SERPL-MCNC: 1.53 MG/DL — HIGH (ref 0.5–1.3)
GLUCOSE SERPL-MCNC: 99 MG/DL — SIGNIFICANT CHANGE UP (ref 70–115)
HCT VFR BLD CALC: 27.3 % — LOW (ref 42–52)
HCT VFR BLD CALC: 31.3 % — LOW (ref 42–52)
HGB BLD-MCNC: 10.2 G/DL — LOW (ref 14–18)
HGB BLD-MCNC: 9.1 G/DL — LOW (ref 14–18)
MAGNESIUM SERPL-MCNC: 2 MG/DL — SIGNIFICANT CHANGE UP (ref 1.6–2.6)
MCHC RBC-ENTMCNC: 29.9 PG — SIGNIFICANT CHANGE UP (ref 27–31)
MCHC RBC-ENTMCNC: 30 PG — SIGNIFICANT CHANGE UP (ref 27–31)
MCHC RBC-ENTMCNC: 32.6 G/DL — SIGNIFICANT CHANGE UP (ref 32–36)
MCHC RBC-ENTMCNC: 33.3 G/DL — SIGNIFICANT CHANGE UP (ref 32–36)
MCV RBC AUTO: 89.8 FL — SIGNIFICANT CHANGE UP (ref 80–94)
MCV RBC AUTO: 92.1 FL — SIGNIFICANT CHANGE UP (ref 80–94)
PHOSPHATE SERPL-MCNC: 3.9 MG/DL — SIGNIFICANT CHANGE UP (ref 2.4–4.7)
PLATELET # BLD AUTO: 179 K/UL — SIGNIFICANT CHANGE UP (ref 150–400)
PLATELET # BLD AUTO: 207 K/UL — SIGNIFICANT CHANGE UP (ref 150–400)
POTASSIUM SERPL-MCNC: 4.4 MMOL/L — SIGNIFICANT CHANGE UP (ref 3.5–5.3)
POTASSIUM SERPL-SCNC: 4.4 MMOL/L — SIGNIFICANT CHANGE UP (ref 3.5–5.3)
RBC # BLD: 3.04 M/UL — LOW (ref 4.6–6.2)
RBC # BLD: 3.4 M/UL — LOW (ref 4.6–6.2)
RBC # FLD: 12.4 % — SIGNIFICANT CHANGE UP (ref 11–15.6)
RBC # FLD: 12.6 % — SIGNIFICANT CHANGE UP (ref 11–15.6)
SODIUM SERPL-SCNC: 138 MMOL/L — SIGNIFICANT CHANGE UP (ref 135–145)
TYPE + AB SCN PNL BLD: SIGNIFICANT CHANGE UP
WBC # BLD: 8.6 K/UL — SIGNIFICANT CHANGE UP (ref 4.8–10.8)
WBC # BLD: 9.3 K/UL — SIGNIFICANT CHANGE UP (ref 4.8–10.8)
WBC # FLD AUTO: 8.6 K/UL — SIGNIFICANT CHANGE UP (ref 4.8–10.8)
WBC # FLD AUTO: 9.3 K/UL — SIGNIFICANT CHANGE UP (ref 4.8–10.8)

## 2018-07-09 PROCEDURE — 99232 SBSQ HOSP IP/OBS MODERATE 35: CPT

## 2018-07-09 RX ORDER — FINASTERIDE 5 MG/1
5 TABLET, FILM COATED ORAL DAILY
Qty: 0 | Refills: 0 | Status: DISCONTINUED | OUTPATIENT
Start: 2018-07-09 | End: 2018-07-11

## 2018-07-09 RX ORDER — LACTULOSE 10 G/15ML
10 SOLUTION ORAL EVERY 8 HOURS
Qty: 0 | Refills: 0 | Status: DISCONTINUED | OUTPATIENT
Start: 2018-07-09 | End: 2018-07-11

## 2018-07-09 RX ORDER — ACETAMINOPHEN 500 MG
650 TABLET ORAL EVERY 6 HOURS
Qty: 0 | Refills: 0 | Status: DISCONTINUED | OUTPATIENT
Start: 2018-07-09 | End: 2018-07-11

## 2018-07-09 RX ORDER — LACTULOSE 10 G/15ML
15 SOLUTION ORAL ONCE
Qty: 0 | Refills: 0 | Status: COMPLETED | OUTPATIENT
Start: 2018-07-09 | End: 2018-07-09

## 2018-07-09 RX ADMIN — ZOLPIDEM TARTRATE 5 MILLIGRAM(S): 10 TABLET ORAL at 21:41

## 2018-07-09 RX ADMIN — PANTOPRAZOLE SODIUM 40 MILLIGRAM(S): 20 TABLET, DELAYED RELEASE ORAL at 05:27

## 2018-07-09 RX ADMIN — TAMSULOSIN HYDROCHLORIDE 0.4 MILLIGRAM(S): 0.4 CAPSULE ORAL at 21:41

## 2018-07-09 RX ADMIN — PRIMIDONE 50 MILLIGRAM(S): 250 TABLET ORAL at 11:56

## 2018-07-09 RX ADMIN — LACTULOSE 15 GRAM(S): 10 SOLUTION ORAL at 09:47

## 2018-07-09 RX ADMIN — Medication 2 GRAM(S): at 05:28

## 2018-07-09 RX ADMIN — Medication 375 MILLIGRAM(S): at 05:57

## 2018-07-09 RX ADMIN — MEMANTINE HYDROCHLORIDE 10 MILLIGRAM(S): 10 TABLET ORAL at 11:56

## 2018-07-09 RX ADMIN — SENNA PLUS 2 TABLET(S): 8.6 TABLET ORAL at 21:41

## 2018-07-09 RX ADMIN — Medication 5 MILLIGRAM(S): at 17:48

## 2018-07-09 RX ADMIN — Medication 375 MILLIGRAM(S): at 07:41

## 2018-07-09 RX ADMIN — Medication 5 MILLIGRAM(S): at 05:27

## 2018-07-09 RX ADMIN — Medication 375 MILLIGRAM(S): at 18:42

## 2018-07-09 RX ADMIN — Medication 100 MILLIGRAM(S): at 05:28

## 2018-07-09 RX ADMIN — Medication 375 MILLIGRAM(S): at 05:27

## 2018-07-09 RX ADMIN — Medication 2 GRAM(S): at 17:48

## 2018-07-09 RX ADMIN — Medication 375 MILLIGRAM(S): at 17:48

## 2018-07-09 RX ADMIN — DONEPEZIL HYDROCHLORIDE 10 MILLIGRAM(S): 10 TABLET, FILM COATED ORAL at 21:41

## 2018-07-09 RX ADMIN — OXYCODONE AND ACETAMINOPHEN 1 TABLET(S): 5; 325 TABLET ORAL at 22:11

## 2018-07-09 RX ADMIN — FINASTERIDE 5 MILLIGRAM(S): 5 TABLET, FILM COATED ORAL at 11:56

## 2018-07-09 RX ADMIN — CEFTRIAXONE 100 GRAM(S): 500 INJECTION, POWDER, FOR SOLUTION INTRAMUSCULAR; INTRAVENOUS at 05:26

## 2018-07-09 RX ADMIN — Medication 250 MILLIGRAM(S): at 05:27

## 2018-07-09 RX ADMIN — OXYCODONE AND ACETAMINOPHEN 1 TABLET(S): 5; 325 TABLET ORAL at 21:41

## 2018-07-09 RX ADMIN — Medication 250 MILLIGRAM(S): at 17:48

## 2018-07-09 RX ADMIN — ENOXAPARIN SODIUM 40 MILLIGRAM(S): 100 INJECTION SUBCUTANEOUS at 12:31

## 2018-07-09 RX ADMIN — FINASTERIDE 5 MILLIGRAM(S): 5 TABLET, FILM COATED ORAL at 17:48

## 2018-07-09 RX ADMIN — Medication 100 MILLIGRAM(S): at 17:48

## 2018-07-09 NOTE — CONSULT NOTE ADULT - PROBLEM SELECTOR RECOMMENDATION 9
this is a vini 86 year old male with constipation, hematuria and urinary retention.  hct is stable.  urine is clearing.  flomax was started.  I have written for finasteride.  I counseled family extensively on possible causes of hematuria including, prostatic bleeding, bladder bleeding, renal bleeding, bladder and kidney cancer.  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  continue bowel regimen  cbi will continue for now.  will need outpatient cysto  will likely dc rob in AM. this is a vini 86 year old male with constipation, hematuria and urinary retention.  hct is stable.  urine culture is negative.  urine is clearing.  flomax was started.  I have written for finasteride.  I counseled family extensively on possible causes of hematuria including, prostatic bleeding, bladder bleeding, renal bleeding, bladder and kidney cancer.  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  continue bowel regimen  cbi will continue for now.  rocephin until cbi is off.  will need outpatient cysto  will likely dc liane in AM.

## 2018-07-09 NOTE — CONSULT NOTE ADULT - SUBJECTIVE AND OBJECTIVE BOX
INTERVAL HPI/OVERNIGHT EVENTS:  this is an 86 year old [place] man sp radiation for prostate Ca who used to be a patient of Dr. Pedraza. He has subsequently seen Dr. Nice in followup.   for the last 2 months, he has had intermittent gross hematuria.  he has been taking oxycodone for backpain.  he woke up 1am Friday morning with urgency and inability to urinate.    Munroe was placed in ED with 500 cc bloody urine.  He has no blood thinners.  He has been constipated, but finally had a large bowel movement today after 2 glasses of prune juice.    He states that he may have passed stones before--he sees debris in his urine.  he denies fevers at home.    he drinks espresso.    Hct stable >30.  CT negative efor stones.        MEDICATIONS  (STANDING):  cefTRIAXone   IVPB 1 Gram(s) IV Intermittent every 24 hours  diazepam    Tablet 5 milliGRAM(s) Oral two times a day  docusate sodium 100 milliGRAM(s) Oral two times a day  donepezil 10 milliGRAM(s) Oral at bedtime  enoxaparin Injectable 40 milliGRAM(s) SubCutaneous every 24 hours  finasteride 5 milliGRAM(s) Oral daily  memantine 10 milliGRAM(s) Oral daily  naproxen 375 milliGRAM(s) Oral two times a day  omega-3-Acid Ethyl Esters 2 Gram(s) Oral two times a day  pantoprazole    Tablet 40 milliGRAM(s) Oral before breakfast  primidone 50 milliGRAM(s) Oral daily  saccharomyces boulardii 250 milliGRAM(s) Oral two times a day  senna 2 Tablet(s) Oral at bedtime  tamsulosin 0.4 milliGRAM(s) Oral at bedtime  zolpidem 5 milliGRAM(s) Oral at bedtime    MEDICATIONS  (PRN):  acetaminophen   Tablet. 650 milliGRAM(s) Oral every 6 hours PRN Moderate Pain (4 - 6)  lactulose Syrup 10 Gram(s) Oral every 8 hours PRN constipation  oxyCODONE    5 mG/acetaminophen 325 mG 1 Tablet(s) Oral every 6 hours PRN Moderate Pain (4 - 6)      Vital Signs Last 24 Hrs  T(C): 36.9 (09 Jul 2018 16:16), Max: 36.9 (09 Jul 2018 00:35)  T(F): 98.4 (09 Jul 2018 16:16), Max: 98.5 (09 Jul 2018 00:35)  HR: 60 (09 Jul 2018 16:16) (55 - 62)  BP: 147/59 (09 Jul 2018 16:16) (147/59 - 164/73)  BP(mean): --  RR: 18 (09 Jul 2018 16:16) (18 - 19)  SpO2: 96% (09 Jul 2018 16:16) (96% - 97%)    PHYSICAL EXAM:    Gen: NAD  abdomen: soft  Extremities: warm  Urine clear, CBI running slow        I&O's Detail    08 Jul 2018 07:01  -  09 Jul 2018 07:00  --------------------------------------------------------  IN:  Total IN: 0 mL    OUT:    Indwelling Catheter - Urethral: 1700 mL  Total OUT: 1700 mL    Total NET: -1700 mL      09 Jul 2018 07:01  -  09 Jul 2018 17:00  --------------------------------------------------------  IN:  Total IN: 0 mL    OUT:    Indwelling Catheter - Urethral: 2300 mL  Total OUT: 2300 mL    Total NET: -2300 mL          LABS:                        10.2   9.3   )-----------( 207      ( 09 Jul 2018 14:46 )             31.3     07-09    138  |  101  |  33.0<H>  ----------------------------<  99  4.4   |  23.0  |  1.53<H>    Ca    9.4      09 Jul 2018 08:34  Phos  3.9     07-09  Mg     2.0     07-09            RADIOLOGY & ADDITIONAL STUDIES:

## 2018-07-09 NOTE — PROGRESS NOTE ADULT - SUBJECTIVE AND OBJECTIVE BOX
Hospital Day # 3 admitted via ER with inability to urinate     POD #    IV: SL    I&O's Summary    08 Jul 2018 07:01  -  09 Jul 2018 07:00  --------------------------------------------------------  IN: 0 mL / OUT: 1700 mL / NET: -1700 mL    diet:  Dash/ TLC     Patient: afebrile VSS resting in bed comfortable in no acute distress, wife present at bedside, rob in place ( 3 way ) draining well    T(C): 36.9 (07-09-18 @ 00:35), Max: 36.9 (07-09-18 @ 00:35)  HR: 62 (07-09-18 @ 00:35) (52 - 62)  BP: 164/73 (07-09-18 @ 00:35) (152/66 - 164/73)  RR: 19 (07-09-18 @ 00:35) (19 - 19)  SpO2: --  Wt(kg): --    chest:  Abdomen: soft non-distended, non-tender on palpation. + BS, + flatus  output: rob catheter in place - inserted last evening approx. 10:30 reportedly with out difficulty with 700 cc bloody urine free flow.  out put since  insertion 1700 bloody urine, no clots noted   Extremities:  bilaterally warm to toes with out calf pain or tenderness, OOB and sitting in chair now for breakfast                   10.5  6.6   )-----------( 206      ( 08 Jul 2018 13:48 )             31.7     07-07    140  |  102  |  43.0<H>  ----------------------------<  229<H>  3.9   |  20.0<L>  |  1.71<H>    Ca    9.3      07 Jul 2018 09:37  Phos  3.0     07-07  Mg     2.2     07-07          xrays:    PAST MEDICAL & SURGICAL HISTORY:  Kidney stones  HTN (hypertension)  Kidney stone  Prostate cancer  [place] (hard of hearing)  Sleep apnea  Hypertension  Alzheimers disease  History of cholecystectomy  H/O neck surgery  History of back surgery  History of appendectomy  History of back surgery: cervical surgery 1992  History of back surgery: 2012  History of appendectomy        discuss with Urologist ( Dr. Nice ) present situation and continued care.   Impression:  Hematuria, rob 3 way in place at present flowing well  Plan: discussed with Dr. Nicolas present situation will discuss with urologist about possibly initiating CBI this morning continued care and management and further w/u if necessary. Hospital Day # 3 admitted via ER with inability to urinate     POD #    IV: SL    I&O's Summary    08 Jul 2018 07:01  -  09 Jul 2018 07:00  --------------------------------------------------------  IN: 0 mL / OUT: 1700 mL / NET: -1700 mL    diet:  Dash/ TLC     Patient: afebrile VSS resting in bed comfortable in no acute distress, wife present at bedside, rob in place ( 3 way ) draining well    T(C): 36.9 (07-09-18 @ 00:35), Max: 36.9 (07-09-18 @ 00:35)  HR: 62 (07-09-18 @ 00:35) (52 - 62)  BP: 164/73 (07-09-18 @ 00:35) (152/66 - 164/73)  RR: 19 (07-09-18 @ 00:35) (19 - 19)  SpO2: --  Wt(kg): --    chest:  Abdomen: soft non-distended, non-tender on palpation. + BS, + flatus  output: rob catheter in place - inserted last evening approx. 10:30 reportedly with out difficulty with 700 cc bloody urine free flow.  out put since  insertion 1700 bloody urine, no clots noted   Extremities:  bilaterally warm to toes with out calf pain or tenderness, OOB and sitting in chair now for breakfast                   10.5  6.6   )-----------( 206      ( 08 Jul 2018 13:48 )             31.7     07-07    140  |  102  |  43.0<H>  ----------------------------<  229<H>  3.9   |  20.0<L>  |  1.71<H>    Ca    9.3      07 Jul 2018 09:37  Phos  3.0     07-07  Mg     2.2     07-07          xrays:    PAST MEDICAL & SURGICAL HISTORY:  Kidney stones  HTN (hypertension)  Kidney stone  Prostate cancer  [place] (hard of hearing)  Sleep apnea  Hypertension  Alzheimers disease  History of cholecystectomy  H/O neck surgery  History of back surgery  History of appendectomy  History of back surgery: cervical surgery 1992  History of back surgery: 2012  History of appendectomy        discuss with Urologist present situation and continued care.   Impression: urinary retention with hematuria, rob 3 way in place at present flowing well, hx. prostate CA, HTN	  Plan: discussed with Dr. Nicolas present situation will discuss with urologist about possibly initiating CBI this morning continued care and management and further w/u if necessary.

## 2018-07-09 NOTE — PROGRESS NOTE ADULT - SUBJECTIVE AND OBJECTIVE BOX
Addendum follow up :      CBI in place and functioning well.  Low flow rate with hematuria cleared no noted active bleeding any longer.  Few episodes earlier in early afternoon with clots and irrigation by nursing and staff cleared clots and flow continued noting clear/ yellow urine.   CBI continues to run low rate with clear / yellow urine ...       Hospital Day #    POD #    IV:    I&O's Summary    08 Jul 2018 07:01  -  09 Jul 2018 07:00  --------------------------------------------------------  IN: 0 mL / OUT: 1700 mL / NET: -1700 mL        diet:    Patient: awake and alert lying in bed. Wife present at bedside.  Patient comfortable no pain or discomfort at present.     T(C): 36.6 (07-09-18 @ 08:04), Max: 36.9 (07-09-18 @ 00:35)  HR: 55 (07-09-18 @ 08:04) (52 - 62)  BP: 152/63 (07-09-18 @ 08:04) (152/63 - 164/73)  RR: 18 (07-09-18 @ 08:04) (18 - 19)  SpO2: 97% (07-09-18 @ 08:04) (97% - 97%)  Wt(kg): --    chest:  Abdomen: soft non-distended, non-tender( supra-pubic region ) on palpation.   output:  rob 3-way CBI in place and functioning well, noting no hematuria at present   Extremities:                          10.2   9.3   )-----------( 207      ( 09 Jul 2018 14:46 )             31.3     07-09    138  |  101  |  33.0<H>  ----------------------------<  99  4.4   |  23.0  |  1.53<H>    Ca    9.4      09 Jul 2018 08:34  Phos  3.9     07-09  Mg     2.0     07-09          xrays:    PAST MEDICAL & SURGICAL HISTORY:  Kidney stones  HTN (hypertension)  Kidney stone  Prostate cancer  [place] (hard of hearing)  Sleep apnea  Hypertension  Alzheimers disease  History of cholecystectomy  H/O neck surgery  History of back surgery  History of appendectomy  History of back surgery: cervical surgery 1992  History of back surgery: 2012  History of appendectomy          Impression: urinary retention, gross hematuria, 3-way rob in place CBI running well ( low flow rate ) clear yellow urine appreciated.    Plan: discussed with Dr. Maria present situation and continued care with Dr. Nice to see patient Tuesday. ( wife also aware and happy at present time )   will continue to follow .

## 2018-07-09 NOTE — PROGRESS NOTE ADULT - SUBJECTIVE AND OBJECTIVE BOX
is an 87 y/o male with PMHx Alzheimer's disease, HTN, prostate CA who presented with acute renal failure and urinary retention. He  still passing clots and having hematuria.     We repeated a cbc today, and it is lower. He has a rob again, I am waiting for CBI initiation.  from urology will come and see . I've ordered a type and screen and I'm repeating cbc tomm am and today at 2:00 pm. Today is day 4 of [facility] and I will continue to monitor him closely.    Summary:   REVIEW OF SYSTEMS    General:	"I'm doing fine."    Skin/Breast:  	  Ophthalmologic:  	  ENMT:	    Respiratory and Thorax:  	  Cardiovascular:	    Gastrointestinal:	    Genitourinary:	    Musculoskeletal:	    Neurological:	    Psychiatric:	    Hematology/Lymphatics:	    Endocrine:	    Allergic/Immunologic:	    Vital Signs Last 24 Hrs  Physical Exam:   Vital Signs Last 24 Hrs  T(C): 36.6 (09 Jul 2018 08:04), Max: 36.9 (09 Jul 2018 00:35)  T(F): 97.8 (09 Jul 2018 08:04), Max: 98.5 (09 Jul 2018 00:35)  HR: 55 (09 Jul 2018 08:04) (52 - 62)  BP: 152/63 (09 Jul 2018 08:04) (152/63 - 164/73)  BP(mean): --  RR: 18 (09 Jul 2018 08:04) (18 - 19)  SpO2: 97% (09 Jul 2018 08:04) (97% - 97%)  Gen: NAD, comfortable, speaking full sentences, awake, alert, resting in bed  HEENT: dry MM  CVS: s1S2 RRR  PULM: good breath sounds  ABD: soft, nontender, no cystitis, no rebound, no guarding  EXTREM: no edema  NEURO: intact  PSYCH: pleasant  : urinating by himself, rob is out, urine is clear (no more red-tinge)                             9.1    8.6   )-----------( 179      ( 09 Jul 2018 08:34 )             27.3     07-09    138  |  101  |  33.0<H>  ----------------------------<  99  4.4   |  23.0  |  1.53<H>    Ca    9.4      09 Jul 2018 08:34  Phos  3.9     07-09  Mg     2.0     07-09    Radiology:     MEDICATIONS  (STANDING):  cefTRIAXone   IVPB 1 Gram(s) IV Intermittent every 24 hours  diazepam    Tablet 5 milliGRAM(s) Oral two times a day  docusate sodium 100 milliGRAM(s) Oral two times a day  donepezil 10 milliGRAM(s) Oral at bedtime  enoxaparin Injectable 40 milliGRAM(s) SubCutaneous every 24 hours  finasteride 5 milliGRAM(s) Oral daily  memantine 10 milliGRAM(s) Oral daily  naproxen 375 milliGRAM(s) Oral two times a day  omega-3-Acid Ethyl Esters 2 Gram(s) Oral two times a day  pantoprazole    Tablet 40 milliGRAM(s) Oral before breakfast  primidone 50 milliGRAM(s) Oral daily  saccharomyces boulardii 250 milliGRAM(s) Oral two times a day  senna 2 Tablet(s) Oral at bedtime  tamsulosin 0.4 milliGRAM(s) Oral at bedtime  zolpidem 5 milliGRAM(s) Oral at bedtime    MEDICATIONS  (PRN):  acetaminophen   Tablet. 650 milliGRAM(s) Oral every 6 hours PRN Moderate Pain (4 - 6)  lactulose Syrup 10 Gram(s) Oral every 8 hours PRN constipation  oxyCODONE    5 mG/acetaminophen 325 mG 1 Tablet(s) Oral every 6 hours PRN Moderate Pain (4 - 6)

## 2018-07-10 LAB
ANION GAP SERPL CALC-SCNC: 14 MMOL/L — SIGNIFICANT CHANGE UP (ref 5–17)
BUN SERPL-MCNC: 30 MG/DL — HIGH (ref 8–20)
CALCIUM SERPL-MCNC: 9.5 MG/DL — SIGNIFICANT CHANGE UP (ref 8.6–10.2)
CHLORIDE SERPL-SCNC: 100 MMOL/L — SIGNIFICANT CHANGE UP (ref 98–107)
CO2 SERPL-SCNC: 24 MMOL/L — SIGNIFICANT CHANGE UP (ref 22–29)
CREAT SERPL-MCNC: 1.46 MG/DL — HIGH (ref 0.5–1.3)
GLUCOSE BLDC GLUCOMTR-MCNC: 122 MG/DL — HIGH (ref 70–99)
GLUCOSE SERPL-MCNC: 103 MG/DL — SIGNIFICANT CHANGE UP (ref 70–115)
HCT VFR BLD CALC: 28.2 % — LOW (ref 42–52)
HGB BLD-MCNC: 9.4 G/DL — LOW (ref 14–18)
MAGNESIUM SERPL-MCNC: 1.9 MG/DL — SIGNIFICANT CHANGE UP (ref 1.6–2.6)
MCHC RBC-ENTMCNC: 30 PG — SIGNIFICANT CHANGE UP (ref 27–31)
MCHC RBC-ENTMCNC: 33.3 G/DL — SIGNIFICANT CHANGE UP (ref 32–36)
MCV RBC AUTO: 90.1 FL — SIGNIFICANT CHANGE UP (ref 80–94)
PHOSPHATE SERPL-MCNC: 3.8 MG/DL — SIGNIFICANT CHANGE UP (ref 2.4–4.7)
PLATELET # BLD AUTO: 188 K/UL — SIGNIFICANT CHANGE UP (ref 150–400)
POTASSIUM SERPL-MCNC: 5 MMOL/L — SIGNIFICANT CHANGE UP (ref 3.5–5.3)
POTASSIUM SERPL-SCNC: 5 MMOL/L — SIGNIFICANT CHANGE UP (ref 3.5–5.3)
RBC # BLD: 3.13 M/UL — LOW (ref 4.6–6.2)
RBC # FLD: 12.5 % — SIGNIFICANT CHANGE UP (ref 11–15.6)
SODIUM SERPL-SCNC: 138 MMOL/L — SIGNIFICANT CHANGE UP (ref 135–145)
WBC # BLD: 8.9 K/UL — SIGNIFICANT CHANGE UP (ref 4.8–10.8)
WBC # FLD AUTO: 8.9 K/UL — SIGNIFICANT CHANGE UP (ref 4.8–10.8)

## 2018-07-10 PROCEDURE — 99232 SBSQ HOSP IP/OBS MODERATE 35: CPT

## 2018-07-10 RX ADMIN — PANTOPRAZOLE SODIUM 40 MILLIGRAM(S): 20 TABLET, DELAYED RELEASE ORAL at 05:43

## 2018-07-10 RX ADMIN — Medication 375 MILLIGRAM(S): at 18:15

## 2018-07-10 RX ADMIN — Medication 2 GRAM(S): at 05:42

## 2018-07-10 RX ADMIN — Medication 5 MILLIGRAM(S): at 05:42

## 2018-07-10 RX ADMIN — Medication 250 MILLIGRAM(S): at 05:42

## 2018-07-10 RX ADMIN — PRIMIDONE 50 MILLIGRAM(S): 250 TABLET ORAL at 11:55

## 2018-07-10 RX ADMIN — LACTULOSE 10 GRAM(S): 10 SOLUTION ORAL at 15:00

## 2018-07-10 RX ADMIN — ZOLPIDEM TARTRATE 5 MILLIGRAM(S): 10 TABLET ORAL at 20:57

## 2018-07-10 RX ADMIN — Medication 100 MILLIGRAM(S): at 05:42

## 2018-07-10 RX ADMIN — ENOXAPARIN SODIUM 40 MILLIGRAM(S): 100 INJECTION SUBCUTANEOUS at 11:55

## 2018-07-10 RX ADMIN — FINASTERIDE 5 MILLIGRAM(S): 5 TABLET, FILM COATED ORAL at 11:55

## 2018-07-10 RX ADMIN — Medication 375 MILLIGRAM(S): at 05:43

## 2018-07-10 RX ADMIN — Medication 2 GRAM(S): at 18:15

## 2018-07-10 RX ADMIN — SENNA PLUS 2 TABLET(S): 8.6 TABLET ORAL at 20:56

## 2018-07-10 RX ADMIN — Medication 100 MILLIGRAM(S): at 16:14

## 2018-07-10 RX ADMIN — MEMANTINE HYDROCHLORIDE 10 MILLIGRAM(S): 10 TABLET ORAL at 11:55

## 2018-07-10 RX ADMIN — Medication 375 MILLIGRAM(S): at 06:13

## 2018-07-10 RX ADMIN — Medication 250 MILLIGRAM(S): at 16:14

## 2018-07-10 RX ADMIN — Medication 5 MILLIGRAM(S): at 16:14

## 2018-07-10 RX ADMIN — DONEPEZIL HYDROCHLORIDE 10 MILLIGRAM(S): 10 TABLET, FILM COATED ORAL at 20:57

## 2018-07-10 RX ADMIN — CEFTRIAXONE 100 GRAM(S): 500 INJECTION, POWDER, FOR SOLUTION INTRAMUSCULAR; INTRAVENOUS at 05:42

## 2018-07-10 RX ADMIN — TAMSULOSIN HYDROCHLORIDE 0.4 MILLIGRAM(S): 0.4 CAPSULE ORAL at 20:57

## 2018-07-10 NOTE — PROGRESS NOTE ADULT - PROBLEM SELECTOR PLAN 1
-cont flomax, cont finasteride  -urinating with a catheter but he's still having dark hematuria  -cbc decreasing, ordering type and screen and repeating cbc for afternoon and tomm am
-cont flomax, cont finasteride  -urinating with a catheter now yellow colored urine  -cont flomax/finasteride  -f/u with outpatient urology  for TOV and cystoscopy
-cont flomax, cont finasteride  -urinating without a catheter but he's still having episodic hematuria  -cbc pending, can consider CBI if bloody urine worsens
Stop cbi    Munroe to gravity    Stable for DC from a gu perspective    FU in office friday for trial of void    Continue flomax
-cont flomax, cont finasteride  -suspect BPH  -he's urinating without rob, please leave ROB OUT

## 2018-07-10 NOTE — PROGRESS NOTE ADULT - ATTENDING COMMENTS
possibly d/c home tomm, after 3rd dose of abx
possibly d/c home tomm, awaiting hematuria to clear up

## 2018-07-10 NOTE — PROGRESS NOTE ADULT - SUBJECTIVE AND OBJECTIVE BOX
is an 85 y/o male with PMHx Alzheimer's disease, HTN, prostate CA who presented with acute renal failure and urinary retention. His renal failure has dramatically improved, he can be discharged tomm with a rob. I spoke with daughter and his wife at the bedside, they are going to learn how to empty the rob. CAse management will sent up VNS for tomm.      Summary:   REVIEW OF SYSTEMS    General:	"I'm doing fine."    Skin/Breast:  	  Ophthalmologic:  	  ENMT:	    Respiratory and Thorax:  	  Cardiovascular:	    Gastrointestinal:	    Genitourinary:	    Musculoskeletal:	    Neurological:	    Psychiatric:	    Hematology/Lymphatics:	    Endocrine:	    Allergic/Immunologic:	    Vital Signs Last 24 Hrs  Physical Exam:   Gen: NAD, comfortable, speaking full sentences, awake, alert, resting in bed  HEENT: dry MM  CVS: s1S2 RRR  PULM: good breath sounds  ABD: soft, nontender, no cystitis, no rebound, no guarding  EXTREM: no edema  NEURO: intact  PSYCH: pleasant  : urinating by himself, rob is out, urine is clear (no more red-tinge)                        9.4    8.9   )-----------( 188      ( 10 Jul 2018 07:46 )             28.2     07-10    138  |  100  |  30.0<H>  ----------------------------<  103  5.0   |  24.0  |  1.46<H>    Ca    9.5      10 Jul 2018 07:46  Phos  3.8     07-10  Mg     1.9     07-10    Radiology:     MEDICATIONS  (STANDING):  cefTRIAXone   IVPB 1 Gram(s) IV Intermittent every 24 hours  diazepam    Tablet 5 milliGRAM(s) Oral two times a day  docusate sodium 100 milliGRAM(s) Oral two times a day  donepezil 10 milliGRAM(s) Oral at bedtime  enoxaparin Injectable 40 milliGRAM(s) SubCutaneous every 24 hours  finasteride 5 milliGRAM(s) Oral daily  memantine 10 milliGRAM(s) Oral daily  naproxen 375 milliGRAM(s) Oral two times a day  omega-3-Acid Ethyl Esters 2 Gram(s) Oral two times a day  pantoprazole    Tablet 40 milliGRAM(s) Oral before breakfast  primidone 50 milliGRAM(s) Oral daily  saccharomyces boulardii 250 milliGRAM(s) Oral two times a day  senna 2 Tablet(s) Oral at bedtime  tamsulosin 0.4 milliGRAM(s) Oral at bedtime  zolpidem 5 milliGRAM(s) Oral at bedtime    MEDICATIONS  (PRN):  acetaminophen   Tablet. 650 milliGRAM(s) Oral every 6 hours PRN Moderate Pain (4 - 6)  lactulose Syrup 10 Gram(s) Oral every 8 hours PRN constipation  oxyCODONE    5 mG/acetaminophen 325 mG 1 Tablet(s) Oral every 6 hours PRN Moderate Pain (4 - 6)

## 2018-07-10 NOTE — PROGRESS NOTE ADULT - PROBLEM SELECTOR PROBLEM 3
Stage 3 chronic kidney disease

## 2018-07-10 NOTE — PROGRESS NOTE ADULT - SUBJECTIVE AND OBJECTIVE BOX
INTERVAL HPI/OVERNIGHT EVENTS:  Feels well.  Urine has cleared up with cbi.    MEDICATIONS  (STANDING):  cefTRIAXone   IVPB 1 Gram(s) IV Intermittent every 24 hours  diazepam    Tablet 5 milliGRAM(s) Oral two times a day  docusate sodium 100 milliGRAM(s) Oral two times a day  donepezil 10 milliGRAM(s) Oral at bedtime  enoxaparin Injectable 40 milliGRAM(s) SubCutaneous every 24 hours  finasteride 5 milliGRAM(s) Oral daily  memantine 10 milliGRAM(s) Oral daily  naproxen 375 milliGRAM(s) Oral two times a day  omega-3-Acid Ethyl Esters 2 Gram(s) Oral two times a day  pantoprazole    Tablet 40 milliGRAM(s) Oral before breakfast  primidone 50 milliGRAM(s) Oral daily  saccharomyces boulardii 250 milliGRAM(s) Oral two times a day  senna 2 Tablet(s) Oral at bedtime  tamsulosin 0.4 milliGRAM(s) Oral at bedtime  zolpidem 5 milliGRAM(s) Oral at bedtime    MEDICATIONS  (PRN):  acetaminophen   Tablet. 650 milliGRAM(s) Oral every 6 hours PRN Moderate Pain (4 - 6)  lactulose Syrup 10 Gram(s) Oral every 8 hours PRN constipation  oxyCODONE    5 mG/acetaminophen 325 mG 1 Tablet(s) Oral every 6 hours PRN Moderate Pain (4 - 6)      Allergies    Allergy Status Unknown  No Known Allergies    Intolerances        Vital Signs Last 24 Hrs  T(C): 36.5 (10 Jul 2018 07:41), Max: 36.9 (09 Jul 2018 16:16)  T(F): 97.7 (10 Jul 2018 07:41), Max: 98.4 (09 Jul 2018 16:16)  HR: 55 (10 Jul 2018 07:41) (55 - 63)  BP: 133/61 (10 Jul 2018 07:41) (132/56 - 147/59)  BP(mean): --  RR: 18 (10 Jul 2018 07:41) (17 - 18)  SpO2: 97% (10 Jul 2018 07:41) (95% - 97%)     ON PE:  General: alert and awake  Abdomen: soft, nt, nd, bs+       LABS:                        9.4    8.9   )-----------( 188      ( 10 Jul 2018 07:46 )             28.2     07-10    138  |  100  |  30.0<H>  ----------------------------<  103  5.0   |  24.0  |  1.46<H>    Ca    9.5      10 Jul 2018 07:46  Phos  3.8     07-10  Mg     1.9     07-10            RADIOLOGY & ADDITIONAL TESTS:

## 2018-07-10 NOTE — PROGRESS NOTE ADULT - PROBLEM SELECTOR PLAN 4
-new diagnosis  -most likely has diabetic nephropathy  -RAISS  -A1C sent for milagro burden  -family is aware
-A1C is 4.8, he's not a diabetic  -sugars were elevated during admisison most likely secondary to stress
-A1C is 4.8, he's not a diabetic  -sugars were elevated during admisison most likely secondary to stress
-A1C is 4.8, he's not a diabetic  -sugars were elevated yesterday most likely secondary to stress

## 2018-07-10 NOTE — PROGRESS NOTE ADULT - PROBLEM SELECTOR PLAN 3
-creatinine normalizing  -renal failure is resolving, most likely a post-obstructive uropathy
-creatinine stable, baseline creatinine is 2 as an outpatient  -he can f/u with outpatient nephrologist given his CKD
-creatinine stable, baseline creatinine is 2 as an outpatient  -he can f/u with outpatient nephrologist given his CKD  -creatinine is downtrending, no diagnosis of diabetes  -will continue rob for now
-creatinine stable  -no signs of obstructive uropathy as creatinine stayed the same even after he resumed voiding

## 2018-07-10 NOTE — PROGRESS NOTE ADULT - PROBLEM SELECTOR PLAN 2
-stable  -no signs of iron deficiency
-stable  -no signs of iron deficiency  -may benefit from some EPO, anemia is most likely secondary to kidney disease  -epo 10,000 U x1 now  -outpatient nephrology f/u
-stable  -no signs of iron deficiency  -may benefit from some EPO, anemia is most likely secondary to kidney disease  -outpatient nephrology f/u
-stable  -no signs of iron deficiency  -may benefit from some EPO, anemia is most likely secondary to kidney disease  -epo 10,000 U x1 now  -outpatient nephrology f/u

## 2018-07-11 ENCOUNTER — TRANSCRIPTION ENCOUNTER (OUTPATIENT)
Age: 83
End: 2018-07-11

## 2018-07-11 VITALS — HEART RATE: 66 BPM | SYSTOLIC BLOOD PRESSURE: 123 MMHG | TEMPERATURE: 98 F | DIASTOLIC BLOOD PRESSURE: 54 MMHG

## 2018-07-11 PROCEDURE — 83605 ASSAY OF LACTIC ACID: CPT

## 2018-07-11 PROCEDURE — 85027 COMPLETE CBC AUTOMATED: CPT

## 2018-07-11 PROCEDURE — 86850 RBC ANTIBODY SCREEN: CPT

## 2018-07-11 PROCEDURE — 80053 COMPREHEN METABOLIC PANEL: CPT

## 2018-07-11 PROCEDURE — 99239 HOSP IP/OBS DSCHRG MGMT >30: CPT

## 2018-07-11 PROCEDURE — 80048 BASIC METABOLIC PNL TOTAL CA: CPT

## 2018-07-11 PROCEDURE — 86901 BLOOD TYPING SEROLOGIC RH(D): CPT

## 2018-07-11 PROCEDURE — 84466 ASSAY OF TRANSFERRIN: CPT

## 2018-07-11 PROCEDURE — 82962 GLUCOSE BLOOD TEST: CPT

## 2018-07-11 PROCEDURE — 99285 EMERGENCY DEPT VISIT HI MDM: CPT | Mod: 25

## 2018-07-11 PROCEDURE — 85730 THROMBOPLASTIN TIME PARTIAL: CPT

## 2018-07-11 PROCEDURE — 74176 CT ABD & PELVIS W/O CONTRAST: CPT

## 2018-07-11 PROCEDURE — 84484 ASSAY OF TROPONIN QUANT: CPT

## 2018-07-11 PROCEDURE — 83550 IRON BINDING TEST: CPT

## 2018-07-11 PROCEDURE — 86900 BLOOD TYPING SEROLOGIC ABO: CPT

## 2018-07-11 PROCEDURE — 83735 ASSAY OF MAGNESIUM: CPT

## 2018-07-11 PROCEDURE — 71250 CT THORAX DX C-: CPT

## 2018-07-11 PROCEDURE — 87086 URINE CULTURE/COLONY COUNT: CPT

## 2018-07-11 PROCEDURE — 82728 ASSAY OF FERRITIN: CPT

## 2018-07-11 PROCEDURE — 51702 INSERT TEMP BLADDER CATH: CPT

## 2018-07-11 PROCEDURE — 85610 PROTHROMBIN TIME: CPT

## 2018-07-11 PROCEDURE — 84100 ASSAY OF PHOSPHORUS: CPT

## 2018-07-11 PROCEDURE — 81001 URINALYSIS AUTO W/SCOPE: CPT

## 2018-07-11 PROCEDURE — 83036 HEMOGLOBIN GLYCOSYLATED A1C: CPT

## 2018-07-11 PROCEDURE — 36415 COLL VENOUS BLD VENIPUNCTURE: CPT

## 2018-07-11 RX ORDER — SENNA PLUS 8.6 MG/1
2 TABLET ORAL
Qty: 60 | Refills: 0
Start: 2018-07-11 | End: 2018-08-09

## 2018-07-11 RX ORDER — LACTULOSE 10 G/15ML
15 SOLUTION ORAL
Qty: 225 | Refills: 0
Start: 2018-07-11 | End: 2018-07-25

## 2018-07-11 RX ORDER — PANTOPRAZOLE SODIUM 20 MG/1
1 TABLET, DELAYED RELEASE ORAL
Qty: 30 | Refills: 0
Start: 2018-07-11 | End: 2018-08-09

## 2018-07-11 RX ORDER — TAMSULOSIN HYDROCHLORIDE 0.4 MG/1
1 CAPSULE ORAL
Qty: 30 | Refills: 0
Start: 2018-07-11 | End: 2018-08-09

## 2018-07-11 RX ORDER — FINASTERIDE 5 MG/1
1 TABLET, FILM COATED ORAL
Qty: 30 | Refills: 0
Start: 2018-07-11 | End: 2018-08-09

## 2018-07-11 RX ORDER — DOCUSATE SODIUM 100 MG
1 CAPSULE ORAL
Qty: 60 | Refills: 0
Start: 2018-07-11 | End: 2018-08-09

## 2018-07-11 RX ORDER — ACETAMINOPHEN 500 MG
2 TABLET ORAL
Qty: 0 | Refills: 0 | DISCHARGE
Start: 2018-07-11

## 2018-07-11 RX ADMIN — MEMANTINE HYDROCHLORIDE 10 MILLIGRAM(S): 10 TABLET ORAL at 13:23

## 2018-07-11 RX ADMIN — FINASTERIDE 5 MILLIGRAM(S): 5 TABLET, FILM COATED ORAL at 13:24

## 2018-07-11 RX ADMIN — Medication 250 MILLIGRAM(S): at 04:59

## 2018-07-11 RX ADMIN — PRIMIDONE 50 MILLIGRAM(S): 250 TABLET ORAL at 13:24

## 2018-07-11 RX ADMIN — PANTOPRAZOLE SODIUM 40 MILLIGRAM(S): 20 TABLET, DELAYED RELEASE ORAL at 04:59

## 2018-07-11 RX ADMIN — CEFTRIAXONE 100 GRAM(S): 500 INJECTION, POWDER, FOR SOLUTION INTRAMUSCULAR; INTRAVENOUS at 04:58

## 2018-07-11 RX ADMIN — Medication 100 MILLIGRAM(S): at 04:59

## 2018-07-11 RX ADMIN — Medication 375 MILLIGRAM(S): at 04:59

## 2018-07-11 RX ADMIN — ENOXAPARIN SODIUM 40 MILLIGRAM(S): 100 INJECTION SUBCUTANEOUS at 13:25

## 2018-07-11 RX ADMIN — Medication 2 GRAM(S): at 04:59

## 2018-07-11 RX ADMIN — Medication 5 MILLIGRAM(S): at 04:58

## 2018-07-11 NOTE — DISCHARGE NOTE ADULT - MEDICATION SUMMARY - MEDICATIONS TO TAKE
I will START or STAY ON the medications listed below when I get home from the hospital:    finasteride 5 mg oral tablet  -- 1 tab(s) by mouth once a day MDD:1 tab  -- Indication: For bph    acetaminophen 325 mg oral tablet  -- 2 tab(s) by mouth every 6 hours, As needed, Moderate Pain (4 - 6)  -- Indication: For pain    EC-Naprosyn 375 mg oral delayed release tablet  -- 1 tab(s) by mouth 2 times a day - for moderate pain  -- Check with your doctor before becoming pregnant.  It is very important that you take or use this exactly as directed.  Do not skip doses or discontinue unless directed by your doctor.  May cause drowsiness or dizziness.  Obtain medical advice before taking any non-prescription drugs as some may affect the action of this medication.  Swallow whole.  Do not crush.  Take with food or milk.    -- Indication: For pain    tamsulosin 0.4 mg oral capsule  -- 1 cap(s) by mouth once a day (at bedtime) MDD:1 cap  -- Indication: For bph    primidone 50 mg oral tablet  --  by mouth once a day  -- Indication: For Anxiety    Valium 5 mg oral tablet  -- 1 tab(s) by mouth 2 times a day  -- Caution federal law prohibits the transfer of this drug to any person other  than the person for whom it was prescribed.  Do not take this drug if you are pregnant.  May cause drowsiness.  Alcohol may intensify this effect.  Use care when operating dangerous machinery.    -- Indication: For Anxiety    Lunesta 1 mg oral tablet  -- 1 tab(s) by mouth once a day (at bedtime)  -- Indication: For Anxiety    Aricept 10 mg oral tablet  -- 1 tab(s) by mouth once a day (at bedtime)  -- Indication: For dementia    docusate sodium 100 mg oral capsule  -- 1 cap(s) by mouth 2 times a day MDD:3  cap  -- Indication: For constipation    lactulose 10 g/15 mL oral syrup  -- 15 milliliter(s) by mouth every 8 hours, As needed, constipation MDD:45 mL  -- Indication: For constipation    senna oral tablet  -- 2 tab(s) by mouth once a day (at bedtime) MDD:2 tabs  -- Indication: For constipation    Namenda 10 mg oral tablet  --  by mouth once a day  -- Indication: For dementia    Fish Oil 1000 mg oral capsule  -- 2 cap(s) by mouth 2 times a day  -- Indication: For Supplementation    glucosamine  -- 2 cap(s) by mouth 2 times a day  -- Indication: For Supplementation    Co Q-10 100 mg oral capsule  --  by mouth 2 times a day  -- Indication: For Supplementation    pantoprazole 40 mg oral delayed release tablet  -- 1 tab(s) by mouth once a day (before a meal) MDD:1 tab  -- Indication: For Gi prophylaxis    Vitamin D3 5000 intl units oral tablet  -- 1 tab(s) by mouth once a day  -- Indication: For Supplementation

## 2018-07-11 NOTE — DISCHARGE NOTE ADULT - CARE PLAN
Principal Discharge DX:	Obstructive uropathy  Goal:	Please stop naprosyn, no cipro, no lisinopril. Please continue rob and f/u with   Assessment and plan of treatment:	Please take all meds and f/u with PMD and f/u with outpatient urologist  Secondary Diagnosis:	Stage 3 chronic kidney disease  Secondary Diagnosis:	Urinary retention

## 2018-07-11 NOTE — DISCHARGE NOTE ADULT - PATIENT PORTAL LINK FT
You can access the [facility] Patient Portal, offered by [facility], by registering with the following website: [email or web address]

## 2018-07-11 NOTE — DISCHARGE NOTE ADULT - MEDICATION SUMMARY - MEDICATIONS TO STOP TAKING
I will STOP taking the medications listed below when I get home from the hospital:    lisinopril 10 mg oral tablet  -- 1 tab(s) by mouth once a day    Cipro 500 mg oral tablet  -- 1 tab(s) by mouth 2 times a day  -- Avoid prolonged or excessive exposure to direct and/or artificial sunlight while taking this medication.  Check with your doctor before becoming pregnant.  Do not take dairy products, antacids, or iron preparations within one hour of this medication.  Finish all this medication unless otherwise directed by prescriber.  Medication should be taken with plenty of water.    EC-Naprosyn 375 mg oral delayed release tablet  -- 1 tab(s) by mouth 2 times a day - for moderate pain  -- Check with your doctor before becoming pregnant.  It is very important that you take or use this exactly as directed.  Do not skip doses or discontinue unless directed by your doctor.  May cause drowsiness or dizziness.  Obtain medical advice before taking any non-prescription drugs as some may affect the action of this medication.  Swallow whole.  Do not crush.  Take with food or milk.    Norco 5 mg-325 mg oral tablet  -- 1 tab(s) by mouth 4 times a day  -- Caution federal law prohibits the transfer of this drug to any person other  than the person for whom it was prescribed.  May cause drowsiness.  Alcohol may intensify this effect.  Use care when operating dangerous machinery.  This product contains acetaminophen.  Do not use  with any other product containing acetaminophen to prevent possible liver damage.  Using more of this medication than prescribed may cause serious breathing problems.

## 2018-07-11 NOTE — DISCHARGE NOTE ADULT - PLAN OF CARE
Please stop naprosyn, no cipro, no lisinopril. Please continue rob and f/u with  Please take all meds and f/u with PMD and f/u with outpatient urologist

## 2018-07-11 NOTE — DISCHARGE NOTE ADULT - HOSPITAL COURSE
is an 85 y/o male with PMHx Alzheimer's disease, HTN, prostate CA who presented with acute renal failure and urinary retention. He had significant hematuria. As per urology, he most likely had constipation from his back pain medications resulting in bleeding from the prostate (his mucosa is most likely friable after his prostate CA treatment). He is having regular BMs and he had a rob with [facility] which has been stopped since 07/10 am. He can be discharged home with rob, I've prescribed finasteride and flomax and the meds have been sent to [facility].     His baseline creatinine before hospitalization was 2, and now it's 1.4 or less. His renal failure has resolved and most likely was secondary to obstructive nephropathy. He will have VNS following him at home. I explained everything to wife and daughter Sil at the bedside and he received rob bag teaching from nursing here.     They are in agreement with the d/c plan and they are satisfied with their care here at [facility].

## 2018-09-10 NOTE — PROCEDURE NOTE - NSSITEPREP_SKIN_A_CORE
Pt called, had hysterectomy a couple years ago and having cramping, thinks may be a cyst, also with nipple discharge, bilaterally and is multi colored   Pt given rx for ultrasound of pelvis and referred to surgical oncology for breast evaluation
povidone iodine (if allergic to chlorhexidine)

## 2018-09-26 NOTE — DISCHARGE NOTE ADULT - REASON FOR ADMISSION
FINAL REPORT



EXAM:  US OB FOLLOW UP



HISTORY:  fetal well being 



TECHNIQUE:  Transabdominal sonography of the pelvis.



PRIORS:  None.



FINDINGS:  

There is a single, live intrauterine pregnancy in cephalic

presentation. Fetal heart motion is detected and fetal heart rate

is 144 beats per minute. Placenta is located left lateral and

there is no evidence of previa. 



Biometric data obtained and corresponds to an estimated

gestational age of 34 weeks 1 day and an ultrasound estimated

date of delivery of 6 November 2018 (it should be noted that an

examination performed earlier in pregnancy may yield more

accurate dating). Fetal survey not performed. EFW: 2358g +/-

349g. Amniotic fluid index is 14.0 cm. 



BPD: 8.48 cm



HC: 30.61 cm



AC: 30.21 cm



FL: 6.62 cm



Remainder of the uterus and adnexa grossly unremarkable.



IMPRESSION:  

1. Single, live intrauterine pregnancy. "I was having difficulty urinating at home."

## 2020-09-24 NOTE — CONSULT NOTE ADULT - CONSULT REASON
Detail Level: Detailed
Quality 110: Preventive Care And Screening: Influenza Immunization: Influenza Immunization Administered during Influenza season
Hematuria
Urinary retention

## 2020-10-19 ENCOUNTER — INPATIENT (INPATIENT)
Facility: HOSPITAL | Age: 85
LOS: 3 days | Discharge: HOME | DRG: 854 | End: 2020-10-23
Attending: HOSPITALIST | Admitting: HOSPITALIST
Payer: MEDICARE

## 2020-10-19 ENCOUNTER — TRANSCRIPTION ENCOUNTER (OUTPATIENT)
Age: 85
End: 2020-10-19

## 2020-10-19 VITALS
SYSTOLIC BLOOD PRESSURE: 201 MMHG | HEIGHT: 63 IN | HEART RATE: 90 BPM | TEMPERATURE: 101 F | RESPIRATION RATE: 20 BRPM | DIASTOLIC BLOOD PRESSURE: 112 MMHG | OXYGEN SATURATION: 95 %

## 2020-10-19 DIAGNOSIS — Z98.89 OTHER SPECIFIED POSTPROCEDURAL STATES: Chronic | ICD-10-CM

## 2020-10-19 DIAGNOSIS — Z90.49 ACQUIRED ABSENCE OF OTHER SPECIFIED PARTS OF DIGESTIVE TRACT: Chronic | ICD-10-CM

## 2020-10-19 DIAGNOSIS — N12 TUBULO-INTERSTITIAL NEPHRITIS, NOT SPECIFIED AS ACUTE OR CHRONIC: ICD-10-CM

## 2020-10-19 LAB
ALBUMIN SERPL ELPH-MCNC: 4.5 G/DL — SIGNIFICANT CHANGE UP (ref 3.3–5.2)
ALP SERPL-CCNC: 87 U/L — SIGNIFICANT CHANGE UP (ref 40–120)
ALT FLD-CCNC: 13 U/L — SIGNIFICANT CHANGE UP
ANION GAP SERPL CALC-SCNC: 13 MMOL/L — SIGNIFICANT CHANGE UP (ref 5–17)
APPEARANCE UR: CLEAR — SIGNIFICANT CHANGE UP
APTT BLD: 30.8 SEC — SIGNIFICANT CHANGE UP (ref 27.5–35.5)
AST SERPL-CCNC: 22 U/L — SIGNIFICANT CHANGE UP
BASOPHILS # BLD AUTO: 0.07 K/UL — SIGNIFICANT CHANGE UP (ref 0–0.2)
BASOPHILS NFR BLD AUTO: 0.4 % — SIGNIFICANT CHANGE UP (ref 0–2)
BILIRUB SERPL-MCNC: 0.6 MG/DL — SIGNIFICANT CHANGE UP (ref 0.4–2)
BILIRUB UR-MCNC: NEGATIVE — SIGNIFICANT CHANGE UP
BUN SERPL-MCNC: 29 MG/DL — HIGH (ref 8–20)
CALCIUM SERPL-MCNC: 9.6 MG/DL — SIGNIFICANT CHANGE UP (ref 8.6–10.2)
CHLORIDE SERPL-SCNC: 99 MMOL/L — SIGNIFICANT CHANGE UP (ref 98–107)
CO2 SERPL-SCNC: 25 MMOL/L — SIGNIFICANT CHANGE UP (ref 22–29)
COLOR SPEC: YELLOW — SIGNIFICANT CHANGE UP
CREAT SERPL-MCNC: 2.15 MG/DL — HIGH (ref 0.5–1.3)
DIFF PNL FLD: ABNORMAL
EOSINOPHIL # BLD AUTO: 0.22 K/UL — SIGNIFICANT CHANGE UP (ref 0–0.5)
EOSINOPHIL NFR BLD AUTO: 1.3 % — SIGNIFICANT CHANGE UP (ref 0–6)
EPI CELLS # UR: SIGNIFICANT CHANGE UP
GLUCOSE SERPL-MCNC: 127 MG/DL — HIGH (ref 70–99)
GLUCOSE UR QL: NEGATIVE MG/DL — SIGNIFICANT CHANGE UP
HCT VFR BLD CALC: 40.1 % — SIGNIFICANT CHANGE UP (ref 39–50)
HGB BLD-MCNC: 12.9 G/DL — LOW (ref 13–17)
HYALINE CASTS # UR AUTO: ABNORMAL /LPF
IMM GRANULOCYTES NFR BLD AUTO: 0.5 % — SIGNIFICANT CHANGE UP (ref 0–1.5)
INR BLD: 1.05 RATIO — SIGNIFICANT CHANGE UP (ref 0.88–1.16)
KETONES UR-MCNC: NEGATIVE — SIGNIFICANT CHANGE UP
LACTATE BLDV-MCNC: 1.3 MMOL/L — SIGNIFICANT CHANGE UP (ref 0.5–2)
LEUKOCYTE ESTERASE UR-ACNC: NEGATIVE — SIGNIFICANT CHANGE UP
LYMPHOCYTES # BLD AUTO: 1.19 K/UL — SIGNIFICANT CHANGE UP (ref 1–3.3)
LYMPHOCYTES # BLD AUTO: 7.2 % — LOW (ref 13–44)
MCHC RBC-ENTMCNC: 30.6 PG — SIGNIFICANT CHANGE UP (ref 27–34)
MCHC RBC-ENTMCNC: 32.2 GM/DL — SIGNIFICANT CHANGE UP (ref 32–36)
MCV RBC AUTO: 95.2 FL — SIGNIFICANT CHANGE UP (ref 80–100)
MONOCYTES # BLD AUTO: 0.97 K/UL — HIGH (ref 0–0.9)
MONOCYTES NFR BLD AUTO: 5.9 % — SIGNIFICANT CHANGE UP (ref 2–14)
NEUTROPHILS # BLD AUTO: 13.98 K/UL — HIGH (ref 1.8–7.4)
NEUTROPHILS NFR BLD AUTO: 84.7 % — HIGH (ref 43–77)
NITRITE UR-MCNC: NEGATIVE — SIGNIFICANT CHANGE UP
PH UR: 5 — SIGNIFICANT CHANGE UP (ref 5–8)
PLATELET # BLD AUTO: 176 K/UL — SIGNIFICANT CHANGE UP (ref 150–400)
POTASSIUM SERPL-MCNC: 4.2 MMOL/L — SIGNIFICANT CHANGE UP (ref 3.5–5.3)
POTASSIUM SERPL-SCNC: 4.2 MMOL/L — SIGNIFICANT CHANGE UP (ref 3.5–5.3)
PROT SERPL-MCNC: 7.8 G/DL — SIGNIFICANT CHANGE UP (ref 6.6–8.7)
PROT UR-MCNC: 30 MG/DL
PROTHROM AB SERPL-ACNC: 12.1 SEC — SIGNIFICANT CHANGE UP (ref 10.6–13.6)
RBC # BLD: 4.21 M/UL — SIGNIFICANT CHANGE UP (ref 4.2–5.8)
RBC # FLD: 12.7 % — SIGNIFICANT CHANGE UP (ref 10.3–14.5)
RBC CASTS # UR COMP ASSIST: SIGNIFICANT CHANGE UP /HPF (ref 0–4)
SODIUM SERPL-SCNC: 137 MMOL/L — SIGNIFICANT CHANGE UP (ref 135–145)
SP GR SPEC: 1.01 — SIGNIFICANT CHANGE UP (ref 1.01–1.02)
UROBILINOGEN FLD QL: NEGATIVE MG/DL — SIGNIFICANT CHANGE UP
WBC # BLD: 16.52 K/UL — HIGH (ref 3.8–10.5)
WBC # FLD AUTO: 16.52 K/UL — HIGH (ref 3.8–10.5)
WBC UR QL: SIGNIFICANT CHANGE UP

## 2020-10-19 PROCEDURE — 99223 1ST HOSP IP/OBS HIGH 75: CPT

## 2020-10-19 PROCEDURE — 74176 CT ABD & PELVIS W/O CONTRAST: CPT | Mod: 26

## 2020-10-19 PROCEDURE — 99285 EMERGENCY DEPT VISIT HI MDM: CPT

## 2020-10-19 RX ORDER — CHOLECALCIFEROL (VITAMIN D3) 125 MCG
400 CAPSULE ORAL DAILY
Refills: 0 | Status: DISCONTINUED | OUTPATIENT
Start: 2020-10-19 | End: 2020-10-20

## 2020-10-19 RX ORDER — PIPERACILLIN AND TAZOBACTAM 4; .5 G/20ML; G/20ML
3.38 INJECTION, POWDER, LYOPHILIZED, FOR SOLUTION INTRAVENOUS ONCE
Refills: 0 | Status: COMPLETED | OUTPATIENT
Start: 2020-10-19 | End: 2020-10-19

## 2020-10-19 RX ORDER — ACETAMINOPHEN 500 MG
975 TABLET ORAL ONCE
Refills: 0 | Status: COMPLETED | OUTPATIENT
Start: 2020-10-19 | End: 2020-10-19

## 2020-10-19 RX ORDER — PIPERACILLIN AND TAZOBACTAM 4; .5 G/20ML; G/20ML
3.38 INJECTION, POWDER, LYOPHILIZED, FOR SOLUTION INTRAVENOUS EVERY 8 HOURS
Refills: 0 | Status: DISCONTINUED | OUTPATIENT
Start: 2020-10-19 | End: 2020-10-20

## 2020-10-19 RX ORDER — PRIMIDONE 250 MG/1
50 TABLET ORAL AT BEDTIME
Refills: 0 | Status: DISCONTINUED | OUTPATIENT
Start: 2020-10-19 | End: 2020-10-20

## 2020-10-19 RX ORDER — SODIUM CHLORIDE 9 MG/ML
1000 INJECTION INTRAMUSCULAR; INTRAVENOUS; SUBCUTANEOUS ONCE
Refills: 0 | Status: COMPLETED | OUTPATIENT
Start: 2020-10-19 | End: 2020-10-19

## 2020-10-19 RX ORDER — MEMANTINE HYDROCHLORIDE 10 MG/1
10 TABLET ORAL DAILY
Refills: 0 | Status: DISCONTINUED | OUTPATIENT
Start: 2020-10-19 | End: 2020-10-20

## 2020-10-19 RX ORDER — DONEPEZIL HYDROCHLORIDE 10 MG/1
10 TABLET, FILM COATED ORAL AT BEDTIME
Refills: 0 | Status: DISCONTINUED | OUTPATIENT
Start: 2020-10-19 | End: 2020-10-20

## 2020-10-19 RX ORDER — MORPHINE SULFATE 50 MG/1
1 CAPSULE, EXTENDED RELEASE ORAL EVERY 6 HOURS
Refills: 0 | Status: DISCONTINUED | OUTPATIENT
Start: 2020-10-19 | End: 2020-10-20

## 2020-10-19 RX ORDER — ZOLPIDEM TARTRATE 10 MG/1
5 TABLET ORAL AT BEDTIME
Refills: 0 | Status: DISCONTINUED | OUTPATIENT
Start: 2020-10-19 | End: 2020-10-20

## 2020-10-19 RX ORDER — ACETAMINOPHEN 500 MG
650 TABLET ORAL EVERY 6 HOURS
Refills: 0 | Status: DISCONTINUED | OUTPATIENT
Start: 2020-10-19 | End: 2020-10-20

## 2020-10-19 RX ADMIN — Medication 975 MILLIGRAM(S): at 17:24

## 2020-10-19 RX ADMIN — Medication 975 MILLIGRAM(S): at 16:18

## 2020-10-19 RX ADMIN — SODIUM CHLORIDE 1000 MILLILITER(S): 9 INJECTION INTRAMUSCULAR; INTRAVENOUS; SUBCUTANEOUS at 16:18

## 2020-10-19 RX ADMIN — PIPERACILLIN AND TAZOBACTAM 200 GRAM(S): 4; .5 INJECTION, POWDER, LYOPHILIZED, FOR SOLUTION INTRAVENOUS at 16:18

## 2020-10-19 NOTE — H&P ADULT - NSHPPHYSICALEXAM_GEN_ALL_CORE
Vital Signs Last 24 Hrs  T(C): 36.7 (20 Oct 2020 00:39), Max: 38.4 (19 Oct 2020 14:58)  T(F): 98.1 (20 Oct 2020 00:39), Max: 101.2 (19 Oct 2020 14:58)  HR: 69 (20 Oct 2020 00:39) (63 - 90)  BP: 173/79 (20 Oct 2020 00:39) (156/67 - 201/112)  BP(mean): --  RR: 20 (20 Oct 2020 00:39) (20 - 22)  SpO2: 96% (20 Oct 2020 00:39) (95% - 98%)    GENERAL:  Well-appearing elderly male, not in acute distress  EYES:  Clear conjunctiva, extraocular movement intact  ENT: Moist mucous membranes  RESP:  Non-labored breathing pattern, lungs clear to ausculation  CV: Regular rate and rhythm, no murmurs appreciated, no lower extremity edema  GI: Soft, non-tender, non-distended  : No CVA tenderness (pt reporting improvement)  NEURO: Awake, alert, conversant, upper and lower extremity strength 5/5, light touch sensation grossly intact  PSYCH: Calm, cooperative  SKIN: No rash or lesions, warm and dry

## 2020-10-19 NOTE — H&P ADULT - HISTORY OF PRESENT ILLNESS
88yoM hx Alzheimer’s dementia, prostate CA (diagnosed 2010, s/p treatment, in remission), tremors, nephrolithiasis, CKD, HTN, presenting with flank pain and fever.  Pt AOx3 and able to provide meaningful hx.  Pt reports several day hx of sharp right sided flank pain associated with episodic confusion and subjective fevers and chills.  Pt reports chronic urinary frequency which he attributes to a prior prostate procedure.  He denies any nausea, vomiting, hematuria, dysuria.  Pt reporting some constipation with last BM about 3-4 days ago.    On admission, pt febrile, tachycardic, hypertensive.  UA negative. Labs notable for WBC 16.5. CT A/P showed 5 mm right mid ureteral calculus causing mild right hydroureteronephrosis.  In ED, pt received 1L and Zosyn.   88yoM hx Alzheimer’s dementia, prostate CA (diagnosed 2010, s/p treatment, in remission), tremors, nephrolithiasis, CKD, HTN, presenting with flank pain and fever.  Pt AOx3 and able to provide meaningful hx.  Pt reports several day hx of sharp right sided flank pain associated with episodic confusion and subjective fevers and chills.  Pt reports chronic urinary frequency which he attributes to a prior prostate procedure.  He denies any nausea, vomiting, hematuria, dysuria.  Pt reporting some constipation with last BM about 3-4 days ago.    On admission, pt febrile, tachycardic, hypertensive.  UA negative. Labs notable for WBC 16.5. CT A/P showed 5 mm right mid ureteral calculus causing mild right hydroureteronephrosis.  In ED, pt received 1L and Zosyn.

## 2020-10-19 NOTE — ED ADULT NURSE NOTE - PMH
Alzheimers disease    [place] (hard of hearing)    HTN (hypertension)    Hypertension    Kidney stone    Kidney stones    Prostate cancer    Sleep apnea

## 2020-10-19 NOTE — ED ADULT NURSE REASSESSMENT NOTE - NS ED NURSE REASSESS COMMENT FT1
Assumed pt care @  . Report received from day RN Jo. Pt A&Ox4 c/o R flank pain that has subsided at the moment. Pt has no complaints at this time. Pt denies any HA, dizziness, blurred vision, SOB, chest pain, NVD, dysuria. Pt placed on CM w/. Resp even and unlabored. NAD present. Pt resting comfortably in stretcher w/call bell in reach. Pt safety maintained. Pt made aware of plan of care and verbalized understanding. Pending radiology results at this time.

## 2020-10-19 NOTE — ED PROVIDER NOTE - PMH
Alzheimers disease    [place] (hard of hearing)    HTN (hypertension)    Hypertension    Kidney stone    Kidney stones    Prostate cancer    Sleep apnea

## 2020-10-19 NOTE — ED ADULT NURSE REASSESSMENT NOTE - NS ED NURSE REASSESS COMMENT FT1
Pt is resting in bed comfortably at this time, no apparent distress noted at this time. pt safety maintained. Pt denies any complaints at this time. Awaiting bed in HR @ this time.

## 2020-10-19 NOTE — H&P ADULT - NSICDXPASTMEDICALHX_GEN_ALL_CORE_FT
PAST MEDICAL HISTORY:  Alzheimers disease     [place] (hard of hearing)     HTN (hypertension)     Hypertension     Kidney stone     Kidney stones     Prostate cancer     Sleep apnea

## 2020-10-19 NOTE — ED ADULT NURSE NOTE - ED STAT RN HANDOFF DETAILS
Report given to TATYANA Srinivasan. Pt A&Ox4, resps even/unlabored, NAD present @ this time. Plan of care discussed with pt, pt verbalized understanding.

## 2020-10-19 NOTE — ED ADULT TRIAGE NOTE - CHIEF COMPLAINT QUOTE
Back pain and unable to have bowel movement.  Last BM 3-4 days ago. Denies trauma/falls. c/o right gas pains.  c/o being cold. hx of kidney stones.

## 2020-10-19 NOTE — H&P ADULT - NSICDXPASTSURGICALHX_GEN_ALL_CORE_FT
PAST SURGICAL HISTORY:  H/O neck surgery     History of appendectomy     History of appendectomy     History of back surgery     History of back surgery 2012    History of back surgery cervical surgery 1992    History of cholecystectomy

## 2020-10-19 NOTE — H&P ADULT - ASSESSMENT
88yoM hx Alzheimer’s dementia, prostate CA (diagnosed 2010, s/p treatment, in remission), tremors, nephrolithiasis, CKD, HTN presenting with several days of right sided flank pain associated with episodic confusion and subjective fevers and chills, on admission found to have fever, leukocytosis and CT A/P showing 5mm R-mid ureteral calculus    Sepsis due to infected renal stone  -Urology consulted by ED  -Case discussed between ED attending and urologist Dr. Jeffers who advised NPO after midnight in anticipation of possible stone removal  -NPO after midnight for tentative urologic procedure  -In ED, received zosyn, will continue  -Lactate 1.3, received 1L IVF  -Held off on maintenance IVF due to hypertensive  -Urine and blood cultures pending  -Pain control with Tylenol and low dose morphine    ADITI on CKD  -Baseline Cr appears to be 1.4 to 1.7  -Likely pre-renal from sepsis, received IVF challenge in ED  -Repeat BMP in AM    HTN  -Not on any home BP agents, elevated BP on admission   -Start amlodipine 5mg, titrate as indicated     Alzheimer’s dementia   -Aricept and namenda resumed    Tremors  -Primidone resumed    Constipation  -CT A/P showing moderate fevers in colon  -Start senna and Dulcolax PRN    Insomnia  -Zolpidem PRN (home med)    Prophylactic measure  -No pharmacological AC given tentative plan for urologic procedure, intermittent pneumatic compressions

## 2020-10-19 NOTE — ED PROVIDER NOTE - PROGRESS NOTE DETAILS
Mg MAO: discussed case with Dr Jeffers (), will keep patient NPO after midnight. Patient admitted to hospitalist.

## 2020-10-19 NOTE — ED PROVIDER NOTE - PHYSICAL EXAMINATION
Gen: Well appearing in NAD  Head: NC/AT  Neck: trachea midline  Resp:  No distress, CTAB  CV: RRR  GI: +distension. No ttp  Ext: no deformities, +R CVA ttp.  Neuro:  A&O appears non focal  Skin:  Warm and dry as visualized  Psych:  Normal affect and mood

## 2020-10-19 NOTE — ED PROVIDER NOTE - OBJECTIVE STATEMENT
88M h/o prostate cancer, alzheimers, kidney stones, HTN p/w R flank pain a/w constipation x 3 days. Last BM 3 days ago, small amount of flatus this morning. Febrile x 2 days. Denies cough, CP, dysuria, hematuria, travel, sick contacts.

## 2020-10-19 NOTE — H&P ADULT - NSHPLABSRESULTS_GEN_ALL_CORE
12.9   16.52 )-----------( 176      ( 19 Oct 2020 16:13 )             40.1       10-19    137  |  99  |  29.0<H>  ----------------------------<  127<H>  4.2   |  25.0  |  2.15<H>    Ca    9.6      19 Oct 2020 16:13    TPro  7.8  /  Alb  4.5  /  TBili  0.6  /  DBili  x   /  AST  22  /  ALT  13  /  AlkPhos  87  10-19

## 2020-10-20 LAB
ANION GAP SERPL CALC-SCNC: 17 MMOL/L — SIGNIFICANT CHANGE UP (ref 5–17)
BASOPHILS # BLD AUTO: 0.09 K/UL — SIGNIFICANT CHANGE UP (ref 0–0.2)
BASOPHILS NFR BLD AUTO: 0.6 % — SIGNIFICANT CHANGE UP (ref 0–2)
BUN SERPL-MCNC: 30 MG/DL — HIGH (ref 8–20)
CALCIUM SERPL-MCNC: 9.1 MG/DL — SIGNIFICANT CHANGE UP (ref 8.6–10.2)
CHLORIDE SERPL-SCNC: 104 MMOL/L — SIGNIFICANT CHANGE UP (ref 98–107)
CO2 SERPL-SCNC: 19 MMOL/L — LOW (ref 22–29)
CREAT SERPL-MCNC: 2.16 MG/DL — HIGH (ref 0.5–1.3)
CULTURE RESULTS: SIGNIFICANT CHANGE UP
EOSINOPHIL # BLD AUTO: 0.2 K/UL — SIGNIFICANT CHANGE UP (ref 0–0.5)
EOSINOPHIL NFR BLD AUTO: 1.3 % — SIGNIFICANT CHANGE UP (ref 0–6)
GLUCOSE SERPL-MCNC: 116 MG/DL — HIGH (ref 70–99)
HCT VFR BLD CALC: 37.2 % — LOW (ref 39–50)
HGB BLD-MCNC: 12.1 G/DL — LOW (ref 13–17)
IMM GRANULOCYTES NFR BLD AUTO: 0.7 % — SIGNIFICANT CHANGE UP (ref 0–1.5)
LYMPHOCYTES # BLD AUTO: 1.7 K/UL — SIGNIFICANT CHANGE UP (ref 1–3.3)
LYMPHOCYTES # BLD AUTO: 11.4 % — LOW (ref 13–44)
MCHC RBC-ENTMCNC: 31 PG — SIGNIFICANT CHANGE UP (ref 27–34)
MCHC RBC-ENTMCNC: 32.5 GM/DL — SIGNIFICANT CHANGE UP (ref 32–36)
MCV RBC AUTO: 95.4 FL — SIGNIFICANT CHANGE UP (ref 80–100)
MONOCYTES # BLD AUTO: 1.24 K/UL — HIGH (ref 0–0.9)
MONOCYTES NFR BLD AUTO: 8.3 % — SIGNIFICANT CHANGE UP (ref 2–14)
NEUTROPHILS # BLD AUTO: 11.61 K/UL — HIGH (ref 1.8–7.4)
NEUTROPHILS NFR BLD AUTO: 77.7 % — HIGH (ref 43–77)
PLATELET # BLD AUTO: 162 K/UL — SIGNIFICANT CHANGE UP (ref 150–400)
POTASSIUM SERPL-MCNC: 4.5 MMOL/L — SIGNIFICANT CHANGE UP (ref 3.5–5.3)
POTASSIUM SERPL-SCNC: 4.5 MMOL/L — SIGNIFICANT CHANGE UP (ref 3.5–5.3)
PROLACTIN SERPL-MCNC: 19.6 NG/ML — HIGH (ref 4.1–18.4)
RBC # BLD: 3.9 M/UL — LOW (ref 4.2–5.8)
RBC # FLD: 12.7 % — SIGNIFICANT CHANGE UP (ref 10.3–14.5)
SARS-COV-2 IGG SERPL QL IA: NEGATIVE — SIGNIFICANT CHANGE UP
SARS-COV-2 IGM SERPL IA-ACNC: <3.8 AU/ML — SIGNIFICANT CHANGE UP
SARS-COV-2 RNA SPEC QL NAA+PROBE: SIGNIFICANT CHANGE UP
SODIUM SERPL-SCNC: 140 MMOL/L — SIGNIFICANT CHANGE UP (ref 135–145)
SPECIMEN SOURCE: SIGNIFICANT CHANGE UP
WBC # BLD: 14.95 K/UL — HIGH (ref 3.8–10.5)
WBC # FLD AUTO: 14.95 K/UL — HIGH (ref 3.8–10.5)

## 2020-10-20 PROCEDURE — 99233 SBSQ HOSP IP/OBS HIGH 50: CPT

## 2020-10-20 RX ORDER — PRIMIDONE 250 MG/1
50 TABLET ORAL AT BEDTIME
Refills: 0 | Status: DISCONTINUED | OUTPATIENT
Start: 2020-10-20 | End: 2020-10-23

## 2020-10-20 RX ORDER — ZOLPIDEM TARTRATE 10 MG/1
5 TABLET ORAL AT BEDTIME
Refills: 0 | Status: DISCONTINUED | OUTPATIENT
Start: 2020-10-20 | End: 2020-10-23

## 2020-10-20 RX ORDER — FENTANYL CITRATE 50 UG/ML
25 INJECTION INTRAVENOUS
Refills: 0 | Status: DISCONTINUED | OUTPATIENT
Start: 2020-10-20 | End: 2020-10-20

## 2020-10-20 RX ORDER — TAMSULOSIN HYDROCHLORIDE 0.4 MG/1
0.4 CAPSULE ORAL AT BEDTIME
Refills: 0 | Status: DISCONTINUED | OUTPATIENT
Start: 2020-10-20 | End: 2020-10-23

## 2020-10-20 RX ORDER — MORPHINE SULFATE 50 MG/1
1 CAPSULE, EXTENDED RELEASE ORAL EVERY 6 HOURS
Refills: 0 | Status: DISCONTINUED | OUTPATIENT
Start: 2020-10-20 | End: 2020-10-23

## 2020-10-20 RX ORDER — SENNA PLUS 8.6 MG/1
2 TABLET ORAL AT BEDTIME
Refills: 0 | Status: DISCONTINUED | OUTPATIENT
Start: 2020-10-20 | End: 2020-10-21

## 2020-10-20 RX ORDER — POLYETHYLENE GLYCOL 3350 17 G/17G
17 POWDER, FOR SOLUTION ORAL ONCE
Refills: 0 | Status: COMPLETED | OUTPATIENT
Start: 2020-10-20 | End: 2020-10-20

## 2020-10-20 RX ORDER — SENNA PLUS 8.6 MG/1
2 TABLET ORAL AT BEDTIME
Refills: 0 | Status: DISCONTINUED | OUTPATIENT
Start: 2020-10-20 | End: 2020-10-20

## 2020-10-20 RX ORDER — DONEPEZIL HYDROCHLORIDE 10 MG/1
10 TABLET, FILM COATED ORAL AT BEDTIME
Refills: 0 | Status: DISCONTINUED | OUTPATIENT
Start: 2020-10-20 | End: 2020-10-23

## 2020-10-20 RX ORDER — HYDRALAZINE HCL 50 MG
5 TABLET ORAL ONCE
Refills: 0 | Status: COMPLETED | OUTPATIENT
Start: 2020-10-20 | End: 2020-10-20

## 2020-10-20 RX ORDER — AMLODIPINE BESYLATE 2.5 MG/1
5 TABLET ORAL DAILY
Refills: 0 | Status: DISCONTINUED | OUTPATIENT
Start: 2020-10-20 | End: 2020-10-20

## 2020-10-20 RX ORDER — PIPERACILLIN AND TAZOBACTAM 4; .5 G/20ML; G/20ML
3.38 INJECTION, POWDER, LYOPHILIZED, FOR SOLUTION INTRAVENOUS EVERY 8 HOURS
Refills: 0 | Status: DISCONTINUED | OUTPATIENT
Start: 2020-10-20 | End: 2020-10-22

## 2020-10-20 RX ORDER — TAMSULOSIN HYDROCHLORIDE 0.4 MG/1
0.4 CAPSULE ORAL AT BEDTIME
Refills: 0 | Status: DISCONTINUED | OUTPATIENT
Start: 2020-10-20 | End: 2020-10-20

## 2020-10-20 RX ORDER — SODIUM CHLORIDE 9 MG/ML
1000 INJECTION INTRAMUSCULAR; INTRAVENOUS; SUBCUTANEOUS
Refills: 0 | Status: DISCONTINUED | OUTPATIENT
Start: 2020-10-20 | End: 2020-10-20

## 2020-10-20 RX ORDER — ACETAMINOPHEN 500 MG
650 TABLET ORAL EVERY 6 HOURS
Refills: 0 | Status: DISCONTINUED | OUTPATIENT
Start: 2020-10-20 | End: 2020-10-23

## 2020-10-20 RX ORDER — POLYETHYLENE GLYCOL 3350 17 G/17G
17 POWDER, FOR SOLUTION ORAL DAILY
Refills: 0 | Status: DISCONTINUED | OUTPATIENT
Start: 2020-10-20 | End: 2020-10-20

## 2020-10-20 RX ORDER — CHOLECALCIFEROL (VITAMIN D3) 125 MCG
400 CAPSULE ORAL DAILY
Refills: 0 | Status: DISCONTINUED | OUTPATIENT
Start: 2020-10-20 | End: 2020-10-23

## 2020-10-20 RX ORDER — POLYETHYLENE GLYCOL 3350 17 G/17G
17 POWDER, FOR SOLUTION ORAL DAILY
Refills: 0 | Status: DISCONTINUED | OUTPATIENT
Start: 2020-10-20 | End: 2020-10-23

## 2020-10-20 RX ORDER — MEMANTINE HYDROCHLORIDE 10 MG/1
10 TABLET ORAL DAILY
Refills: 0 | Status: DISCONTINUED | OUTPATIENT
Start: 2020-10-20 | End: 2020-10-23

## 2020-10-20 RX ORDER — SODIUM CHLORIDE 9 MG/ML
1000 INJECTION INTRAMUSCULAR; INTRAVENOUS; SUBCUTANEOUS
Refills: 0 | Status: DISCONTINUED | OUTPATIENT
Start: 2020-10-20 | End: 2020-10-23

## 2020-10-20 RX ORDER — AMLODIPINE BESYLATE 2.5 MG/1
5 TABLET ORAL DAILY
Refills: 0 | Status: DISCONTINUED | OUTPATIENT
Start: 2020-10-20 | End: 2020-10-21

## 2020-10-20 RX ORDER — ONDANSETRON 8 MG/1
4 TABLET, FILM COATED ORAL ONCE
Refills: 0 | Status: DISCONTINUED | OUTPATIENT
Start: 2020-10-20 | End: 2020-10-20

## 2020-10-20 RX ORDER — SODIUM CHLORIDE 9 MG/ML
1000 INJECTION, SOLUTION INTRAVENOUS
Refills: 0 | Status: DISCONTINUED | OUTPATIENT
Start: 2020-10-20 | End: 2020-10-20

## 2020-10-20 RX ADMIN — Medication 400 UNIT(S): at 13:11

## 2020-10-20 RX ADMIN — TAMSULOSIN HYDROCHLORIDE 0.4 MILLIGRAM(S): 0.4 CAPSULE ORAL at 21:46

## 2020-10-20 RX ADMIN — DONEPEZIL HYDROCHLORIDE 10 MILLIGRAM(S): 10 TABLET, FILM COATED ORAL at 21:46

## 2020-10-20 RX ADMIN — PIPERACILLIN AND TAZOBACTAM 25 GRAM(S): 4; .5 INJECTION, POWDER, LYOPHILIZED, FOR SOLUTION INTRAVENOUS at 06:11

## 2020-10-20 RX ADMIN — AMLODIPINE BESYLATE 5 MILLIGRAM(S): 2.5 TABLET ORAL at 06:11

## 2020-10-20 RX ADMIN — MORPHINE SULFATE 1 MILLIGRAM(S): 50 CAPSULE, EXTENDED RELEASE ORAL at 10:13

## 2020-10-20 RX ADMIN — MEMANTINE HYDROCHLORIDE 10 MILLIGRAM(S): 10 TABLET ORAL at 13:12

## 2020-10-20 RX ADMIN — MORPHINE SULFATE 1 MILLIGRAM(S): 50 CAPSULE, EXTENDED RELEASE ORAL at 01:15

## 2020-10-20 RX ADMIN — DONEPEZIL HYDROCHLORIDE 10 MILLIGRAM(S): 10 TABLET, FILM COATED ORAL at 01:01

## 2020-10-20 RX ADMIN — POLYETHYLENE GLYCOL 3350 17 GRAM(S): 17 POWDER, FOR SOLUTION ORAL at 14:48

## 2020-10-20 RX ADMIN — Medication 1 TABLET(S): at 13:11

## 2020-10-20 RX ADMIN — MORPHINE SULFATE 1 MILLIGRAM(S): 50 CAPSULE, EXTENDED RELEASE ORAL at 01:00

## 2020-10-20 RX ADMIN — PRIMIDONE 50 MILLIGRAM(S): 250 TABLET ORAL at 01:01

## 2020-10-20 RX ADMIN — PIPERACILLIN AND TAZOBACTAM 25 GRAM(S): 4; .5 INJECTION, POWDER, LYOPHILIZED, FOR SOLUTION INTRAVENOUS at 21:46

## 2020-10-20 RX ADMIN — PRIMIDONE 50 MILLIGRAM(S): 250 TABLET ORAL at 21:46

## 2020-10-20 RX ADMIN — PIPERACILLIN AND TAZOBACTAM 25 GRAM(S): 4; .5 INJECTION, POWDER, LYOPHILIZED, FOR SOLUTION INTRAVENOUS at 13:11

## 2020-10-20 RX ADMIN — MORPHINE SULFATE 1 MILLIGRAM(S): 50 CAPSULE, EXTENDED RELEASE ORAL at 09:48

## 2020-10-20 RX ADMIN — AMLODIPINE BESYLATE 5 MILLIGRAM(S): 2.5 TABLET ORAL at 21:47

## 2020-10-20 RX ADMIN — SENNA PLUS 2 TABLET(S): 8.6 TABLET ORAL at 21:46

## 2020-10-20 RX ADMIN — SODIUM CHLORIDE 75 MILLILITER(S): 9 INJECTION INTRAMUSCULAR; INTRAVENOUS; SUBCUTANEOUS at 13:11

## 2020-10-20 RX ADMIN — Medication 5 MILLIGRAM(S): at 09:40

## 2020-10-20 NOTE — CONSULT NOTE ADULT - ASSESSMENT
Ureteral stone, fever, elevated wbc    1.  medical clearance  2.  rapid covid test  3.  npo  4.  for right ureteral stent this evening

## 2020-10-20 NOTE — CONSULT NOTE ADULT - SUBJECTIVE AND OBJECTIVE BOX
HPI:  88yoM hx Alzheimer’s dementia, prostate CA (diagnosed , s/p treatment, in remission), tremors, nephrolithiasis, CKD, HTN, presenting with flank pain and fever.  Pt AOx3 and able to provide meaningful hx.  Pt reports several day hx of sharp right sided flank pain associated with episodic confusion and subjective fevers and chills.  Pt reports chronic urinary frequency which he attributes to a prior prostate procedure.  He denies any nausea, vomiting, hematuria, dysuria.  Pt reporting some constipation with last BM about 3-4 days ago.    On admission, pt febrile, tachycardic, hypertensive.  UA negative. Labs notable for WBC 16.5. CT A/P showed 5 mm right mid ureteral calculus causing mild right hydroureteronephrosis.  In ED, pt received 1L and Zosyn. (19 Oct 2020 22:43)    Right flank pain,.    PAST MEDICAL & SURGICAL HISTORY:  Kidney stones    HTN (hypertension)    Kidney stone    Prostate cancer    [place] (hard of hearing)    Sleep apnea    Hypertension    Alzheimers disease    History of cholecystectomy    H/O neck surgery    History of back surgery    History of appendectomy    History of back surgery  cervical surgery     History of back surgery  2012    History of appendectomy        REVIEW OF SYSTEMS:    Constitutional: No fever, weight loss or fatigue  Eyes: No eye pain, visual disturbances, or discharge  ENMT:  No difficulty hearing, tinnitus, vertigo; No sinus or throat pain  Respiratory: No cough, wheezing, chills or hemoptysis  Cardiovascular: No chest pain, palpitations, shortness of breath, dizziness or leg swelling  Gastrointestinal: no bm x 5 days  Genitourinary: as above  Rectal: No pain, hemorrhoids or incontinence  Neurological: No headaches, memory loss, loss of strength, numbness or tremors  Skin: No itching, burning, rashes or lesions   Lymph Nodes: No enlarged glands  Musculoskeletal: No joint pain or swelling; No muscle, back or extremity pain  Psychiatric: No depression, anxiety, mood swings or difficulty sleeping  Heme/Lymph: No easy bruising or bleeding gums  Allergy and Immunologic: No hives or eczema    MEDICATIONS  (STANDING):  amLODIPine   Tablet 5 milliGRAM(s) Oral daily  cholecalciferol 400 Unit(s) Oral daily  donepezil 10 milliGRAM(s) Oral at bedtime  memantine 10 milliGRAM(s) Oral daily  multivitamin 1 Tablet(s) Oral daily  piperacillin/tazobactam IVPB.. 3.375 Gram(s) IV Intermittent every 8 hours  primidone 50 milliGRAM(s) Oral at bedtime  senna 2 Tablet(s) Oral at bedtime  sodium chloride 0.9%. 1000 milliLiter(s) (75 mL/Hr) IV Continuous <Continuous>  tamsulosin 0.4 milliGRAM(s) Oral at bedtime    MEDICATIONS  (PRN):  acetaminophen   Tablet .. 650 milliGRAM(s) Oral every 6 hours PRN Temp greater or equal to 38C (100.4F), Mild Pain (1 - 3), Moderate Pain (4 - 6)  bisacodyl 5 milliGRAM(s) Oral daily PRN Constipation  morphine  - Injectable 1 milliGRAM(s) IV Push every 6 hours PRN Severe Pain (7 - 10)  zolpidem 5 milliGRAM(s) Oral at bedtime PRN Insomnia      Allergies    No Known Allergies    Intolerances        SOCIAL HISTORY:    FAMILY HISTORY:  No pertinent family history in first degree relatives        Vital Signs Last 24 Hrs  T(C): 37 (20 Oct 2020 10:58), Max: 38.4 (19 Oct 2020 14:58)  T(F): 98.6 (20 Oct 2020 10:58), Max: 101.2 (19 Oct 2020 14:58)  HR: 72 (20 Oct 2020 10:58) (63 - 90)  BP: 139/50 (20 Oct 2020 10:58) (139/50 - 201/112)  BP(mean): --  RR: 18 (20 Oct 2020 10:58) (18 - 22)  SpO2: 95% (20 Oct 2020 10:58) (94% - 98%)    PHYSICAL EXAM:    General: Well developed; well nourished; in no acute distress  Head: Normocephalic; atraumatic  Respiratory: No wheezes, rales or rhonchi  Cardiovascular: Regular rate and rhythm. S1 and S2 Normal; No murmurs, gallops or rubs  Gastrointestinal: Soft mild right abd tenderness  Genitourinary: + right flank tenderness  Extremities: Normal range of motion, No clubbing, cyanosis or edema  Vascular: Peripheral pulses palpable 2+ bilaterally  Neurological: Alert and oriented x4  Skin: Warm and dry. No acute rash  Musculoskeletal: Normal gait, tone, without deformities  Psychiatric: Cooperative and appropriate      LABS:                        12.1   14.95 )-----------( 162      ( 20 Oct 2020 07:32 )             37.2     10-20    140  |  104  |  30.0<H>  ----------------------------<  116<H>  4.5   |  19.0<L>  |  2.16<H>    Ca    9.1      20 Oct 2020 07:32    TPro  7.8  /  Alb  4.5  /  TBili  0.6  /  DBili  x   /  AST  22  /  ALT  13  /  AlkPhos  87  10-19    PT/INR - ( 19 Oct 2020 16:13 )   PT: 12.1 sec;   INR: 1.05 ratio         PTT - ( 19 Oct 2020 16:13 )  PTT:30.8 sec  Urinalysis Basic - ( 19 Oct 2020 16:33 )    Color: Yellow / Appearance: Clear / S.010 / pH: x  Gluc: x / Ketone: Negative  / Bili: Negative / Urobili: Negative mg/dL   Blood: x / Protein: 30 mg/dL / Nitrite: Negative   Leuk Esterase: Negative / RBC: 0-2 /HPF / WBC 0-2   Sq Epi: x / Non Sq Epi: Occasional / Bacteria: x        RADIOLOGY & ADDITIONAL STUDIES:    CT- IMPRESSION:    5 mm right mid ureteral calculus causing mild right hydroureteronephrosis

## 2020-10-20 NOTE — PROGRESS NOTE ADULT - ASSESSMENT
88yoM hx Alzheimer’s dementia, prostate CA (diagnosed 2010, s/p treatment, in remission), tremors, nephrolithiasis, CKD, HTN presenting with several days of right sided flank pain associated with episodic confusion and subjective fevers and chills, on admission found to have fever, leukocytosis and CT A/P showing 5mm R-mid ureteral calculus.     Sepsis due to infected renal stone  CT with 5 mm right mid ureteral calculus causing mild right hydroureteronephrosis  Urology consulted by ED Dr. Jeffers  -Case discussed between ED attending and urologist Dr. Jeffers who advised NPO after midnight in anticipation of possible stone removal  -NPO after midnight for tentative urologic procedure  -Urine and blood cultures pending  -Pain control with Tylenol and low dose morphine  - Continue Zosyn    ADITI on CKD  -Baseline Cr appears to be 1.4 to 1.7  - Cr 2.16  -Likely pre-renal from sepsis, received IVF challenge in ED  - Pending Urology eval as there us mild hydro    HTN  -Not on any home BP agents, elevated BP on admission   -continue amlodipine 5mg, titrate as indicated     Alzheimer’s dementia   -Aricept and namenda resumed    Tremors  -Primidone resumed    Constipation  -CT A/P showing moderate fevers in colon  -Senna and Dulcolax PRN    Insomnia  -Zolpidem PRN (home med)    Prophylactic measure  -No pharmacological AC given tentative plan for urologic procedure, intermittent pneumatic compressions    Dispo: pending Urology

## 2020-10-20 NOTE — PROGRESS NOTE ADULT - SUBJECTIVE AND OBJECTIVE BOX
[facility] Division of Hospital Medicine    Chief Complaint:  Sepsis and infected kidney stone    SUBJECTIVE / OVERNIGHT EVENTS: Patient seen and examined in CDU. Still with back pain in the right flank. No nausea.     Patient denies chest pain, SOB, abd pain, N/V, fever, chills, dysuria or any other complaints. All remainder ROS negative.     MEDICATIONS  (STANDING):  amLODIPine   Tablet 5 milliGRAM(s) Oral daily  cholecalciferol 400 Unit(s) Oral daily  donepezil 10 milliGRAM(s) Oral at bedtime  memantine 10 milliGRAM(s) Oral daily  multivitamin 1 Tablet(s) Oral daily  piperacillin/tazobactam IVPB.. 3.375 Gram(s) IV Intermittent every 8 hours  primidone 50 milliGRAM(s) Oral at bedtime  senna 2 Tablet(s) Oral at bedtime    MEDICATIONS  (PRN):  acetaminophen   Tablet .. 650 milliGRAM(s) Oral every 6 hours PRN Temp greater or equal to 38C (100.4F), Mild Pain (1 - 3), Moderate Pain (4 - 6)  bisacodyl 5 milliGRAM(s) Oral daily PRN Constipation  morphine  - Injectable 1 milliGRAM(s) IV Push every 6 hours PRN Severe Pain (7 - 10)  zolpidem 5 milliGRAM(s) Oral at bedtime PRN Insomnia        I&O's Summary      PHYSICAL EXAM:  Vital Signs Last 24 Hrs  T(C): 37 (20 Oct 2020 10:58), Max: 38.4 (19 Oct 2020 14:58)  T(F): 98.6 (20 Oct 2020 10:58), Max: 101.2 (19 Oct 2020 14:58)  HR: 72 (20 Oct 2020 10:58) (63 - 90)  BP: 139/50 (20 Oct 2020 10:58) (139/50 - 201/112)  BP(mean): --  RR: 18 (20 Oct 2020 10:58) (18 - 22)  SpO2: 95% (20 Oct 2020 10:58) (94% - 98%)      CONSTITUTIONAL: NAD, well-developed, well-groomed  ENMT: Moist oral mucosa, no pharyngeal injection or exudates;  RESPIRATORY: Normal respiratory effort; lungs are clear to auscultation bilaterally  CARDIOVASCULAR: Regular rate and rhythm, normal S1 and S2, no murmur/rub/gallop; No lower extremity edema;  ABDOMEN: Nontender to palpation, normoactive bowel sounds, no rebound/guarding. + right flank pain  MUSCLOSKELETAL:  Normal gait; no clubbing or cyanosis of digits; no joint swelling or tenderness to palpation  PSYCH: A+O to person, place, and time; affect appropriate  NEUROLOGY: CN 2-12 are intact and symmetric; no gross sensory deficits;   SKIN: No rashes; no palpable lesions    LABS:                        12.1   14.95 )-----------( 162      ( 20 Oct 2020 07:32 )             37.2     10-20    140  |  104  |  30.0<H>  ----------------------------<  116<H>  4.5   |  19.0<L>  |  2.16<H>    Ca    9.1      20 Oct 2020 07:32    TPro  7.8  /  Alb  4.5  /  TBili  0.6  /  DBili  x   /  AST  22  /  ALT  13  /  AlkPhos  87  10-19    PT/INR - ( 19 Oct 2020 16:13 )   PT: 12.1 sec;   INR: 1.05 ratio         PTT - ( 19 Oct 2020 16:13 )  PTT:30.8 sec      Urinalysis Basic - ( 19 Oct 2020 16:33 )    Color: Yellow / Appearance: Clear / S.010 / pH: x  Gluc: x / Ketone: Negative  / Bili: Negative / Urobili: Negative mg/dL   Blood: x / Protein: 30 mg/dL / Nitrite: Negative   Leuk Esterase: Negative / RBC: 0-2 /HPF / WBC 0-2   Sq Epi: x / Non Sq Epi: Occasional / Bacteria: x      CAPILLARY BLOOD GLUCOSE        RADIOLOGY & ADDITIONAL TESTS:  Results Reviewed:   Imaging Personally Reviewed:  Electrocardiogram Personally Reviewed:

## 2020-10-21 DIAGNOSIS — N20.0 CALCULUS OF KIDNEY: ICD-10-CM

## 2020-10-21 DIAGNOSIS — N12 TUBULO-INTERSTITIAL NEPHRITIS, NOT SPECIFIED AS ACUTE OR CHRONIC: ICD-10-CM

## 2020-10-21 LAB
ALBUMIN SERPL ELPH-MCNC: 3 G/DL — LOW (ref 3.3–5.2)
ALP SERPL-CCNC: 52 U/L — SIGNIFICANT CHANGE UP (ref 40–120)
ALT FLD-CCNC: 10 U/L — SIGNIFICANT CHANGE UP
ANION GAP SERPL CALC-SCNC: 13 MMOL/L — SIGNIFICANT CHANGE UP (ref 5–17)
AST SERPL-CCNC: 24 U/L — SIGNIFICANT CHANGE UP
BILIRUB SERPL-MCNC: 0.7 MG/DL — SIGNIFICANT CHANGE UP (ref 0.4–2)
BUN SERPL-MCNC: 32 MG/DL — HIGH (ref 8–20)
CALCIUM SERPL-MCNC: 8.7 MG/DL — SIGNIFICANT CHANGE UP (ref 8.6–10.2)
CHLORIDE SERPL-SCNC: 104 MMOL/L — SIGNIFICANT CHANGE UP (ref 98–107)
CO2 SERPL-SCNC: 22 MMOL/L — SIGNIFICANT CHANGE UP (ref 22–29)
CREAT SERPL-MCNC: 2.29 MG/DL — HIGH (ref 0.5–1.3)
GLUCOSE SERPL-MCNC: 123 MG/DL — HIGH (ref 70–99)
HCT VFR BLD CALC: 39.4 % — SIGNIFICANT CHANGE UP (ref 39–50)
HGB BLD-MCNC: 12.5 G/DL — LOW (ref 13–17)
MAGNESIUM SERPL-MCNC: 2.1 MG/DL — SIGNIFICANT CHANGE UP (ref 1.6–2.6)
MCHC RBC-ENTMCNC: 30.8 PG — SIGNIFICANT CHANGE UP (ref 27–34)
MCHC RBC-ENTMCNC: 31.7 GM/DL — LOW (ref 32–36)
MCV RBC AUTO: 97 FL — SIGNIFICANT CHANGE UP (ref 80–100)
PLATELET # BLD AUTO: 159 K/UL — SIGNIFICANT CHANGE UP (ref 150–400)
POTASSIUM SERPL-MCNC: 4.8 MMOL/L — SIGNIFICANT CHANGE UP (ref 3.5–5.3)
POTASSIUM SERPL-SCNC: 4.8 MMOL/L — SIGNIFICANT CHANGE UP (ref 3.5–5.3)
PROT SERPL-MCNC: 6.1 G/DL — LOW (ref 6.6–8.7)
RBC # BLD: 4.06 M/UL — LOW (ref 4.2–5.8)
RBC # FLD: 12.7 % — SIGNIFICANT CHANGE UP (ref 10.3–14.5)
SODIUM SERPL-SCNC: 139 MMOL/L — SIGNIFICANT CHANGE UP (ref 135–145)
WBC # BLD: 11.11 K/UL — HIGH (ref 3.8–10.5)
WBC # FLD AUTO: 11.11 K/UL — HIGH (ref 3.8–10.5)

## 2020-10-21 PROCEDURE — 99223 1ST HOSP IP/OBS HIGH 75: CPT | Mod: AI

## 2020-10-21 PROCEDURE — 99233 SBSQ HOSP IP/OBS HIGH 50: CPT

## 2020-10-21 RX ADMIN — DONEPEZIL HYDROCHLORIDE 10 MILLIGRAM(S): 10 TABLET, FILM COATED ORAL at 22:19

## 2020-10-21 RX ADMIN — PRIMIDONE 50 MILLIGRAM(S): 250 TABLET ORAL at 22:16

## 2020-10-21 RX ADMIN — PIPERACILLIN AND TAZOBACTAM 25 GRAM(S): 4; .5 INJECTION, POWDER, LYOPHILIZED, FOR SOLUTION INTRAVENOUS at 05:21

## 2020-10-21 RX ADMIN — MEMANTINE HYDROCHLORIDE 10 MILLIGRAM(S): 10 TABLET ORAL at 12:41

## 2020-10-21 RX ADMIN — Medication 400 UNIT(S): at 16:22

## 2020-10-21 RX ADMIN — Medication 1 TABLET(S): at 12:41

## 2020-10-21 RX ADMIN — TAMSULOSIN HYDROCHLORIDE 0.4 MILLIGRAM(S): 0.4 CAPSULE ORAL at 22:16

## 2020-10-21 RX ADMIN — SODIUM CHLORIDE 75 MILLILITER(S): 9 INJECTION INTRAMUSCULAR; INTRAVENOUS; SUBCUTANEOUS at 03:33

## 2020-10-21 RX ADMIN — PIPERACILLIN AND TAZOBACTAM 25 GRAM(S): 4; .5 INJECTION, POWDER, LYOPHILIZED, FOR SOLUTION INTRAVENOUS at 22:16

## 2020-10-21 RX ADMIN — PIPERACILLIN AND TAZOBACTAM 25 GRAM(S): 4; .5 INJECTION, POWDER, LYOPHILIZED, FOR SOLUTION INTRAVENOUS at 13:05

## 2020-10-21 NOTE — PROGRESS NOTE ADULT - ASSESSMENT
88yoM hx Alzheimer’s dementia, prostate CA (diagnosed 2010, s/p treatment, in remission), tremors, nephrolithiasis, CKD, HTN presenting with several days of right sided flank pain associated with episodic confusion and subjective fevers and chills, on admission found to have fever, leukocytosis and CT A/P showing 5mm R-mid ureteral calculus. He underwent right ureteral stent  placement on 10/20.    Sepsis due to infected renal stone  CT with 5 mm right mid ureteral calculus causing mild right hydroureteronephrosis  Urology consulted, s/p stent placement  ucx <10,000 urogenital ean   blood cultures pending  -Pain control with Tylenol and low dose morphine  - Continue Zosyn and de-escalate abx based on cultures     ADITI on CKD  -Baseline Cr appears to be 1.4 to 1.7  - Cr 2.29  - continue rob and OVFs  - monitor uop  - avoid nephrotoxins    HTN likely due to pain  -Not on any home BP agents, elevated BP on admission   -will stop amlodipine and  monitor    Alzheimer’s dementia   -Aricept and namenda resumed    Constipation, resolved  - continue bowel regimen    Tremors  -Primidone resumed    Constipation  -CT A/P showing moderate fevers in colon  -Senna and Dulcolax PRN    Insomnia  -Zolpidem PRN (home med)    Prophylactic measure  -scds    Dispo: pending resolution of ADITI

## 2020-10-21 NOTE — PROGRESS NOTE ADULT - SUBJECTIVE AND OBJECTIVE BOX
INTERVAL HPI/OVERNIGHT EVENTS:    MEDICATIONS  (STANDING):  amLODIPine   Tablet 5 milliGRAM(s) Oral daily  cholecalciferol 400 Unit(s) Oral daily  donepezil 10 milliGRAM(s) Oral at bedtime  memantine 10 milliGRAM(s) Oral daily  multivitamin 1 Tablet(s) Oral daily  piperacillin/tazobactam IVPB.. 3.375 Gram(s) IV Intermittent every 8 hours  polyethylene glycol 3350 17 Gram(s) Oral daily  primidone 50 milliGRAM(s) Oral at bedtime  senna 2 Tablet(s) Oral at bedtime  sodium chloride 0.9%. 1000 milliLiter(s) (75 mL/Hr) IV Continuous <Continuous>  tamsulosin 0.4 milliGRAM(s) Oral at bedtime    MEDICATIONS  (PRN):  acetaminophen   Tablet .. 650 milliGRAM(s) Oral every 6 hours PRN Temp greater or equal to 38C (100.4F), Mild Pain (1 - 3), Moderate Pain (4 - 6)  bisacodyl 5 milliGRAM(s) Oral daily PRN Constipation  bisacodyl Suppository 10 milliGRAM(s) Rectal daily PRN Constipation  morphine  - Injectable 1 milliGRAM(s) IV Push every 6 hours PRN Severe Pain (7 - 10)  zolpidem 5 milliGRAM(s) Oral at bedtime PRN Insomnia      Allergies    No Known Allergies    Intolerances        Vital Signs Last 24 Hrs  T(C): 36.3 (21 Oct 2020 00:08), Max: 37.8 (20 Oct 2020 21:10)  T(F): 97.4 (21 Oct 2020 00:08), Max: 100 (20 Oct 2020 21:10)  HR: 81 (21 Oct 2020 00:08) (70 - 100)  BP: 102/50 (21 Oct 2020 00:08) (102/50 - 164/66)  BP(mean): --  RR: 19 (21 Oct 2020 00:08) (14 - 20)  SpO2: 98% (21 Oct 2020 00:08) (95% - 100%)     ON PE:  General: alert and awake  Abdomen: No flank pain currently  : Munroe in place, urine blood tinged as would be expected. Bladder decompressed.    LABS:                        12.1   14.95 )-----------( 162      ( 20 Oct 2020 07:32 )             37.2     10-21    139  |  104  |  32.0<H>  ----------------------------<  123<H>  4.8   |  22.0  |  2.29<H>    Ca    8.7      21 Oct 2020 06:41  Mg     2.1     10-21    TPro  6.1<L>  /  Alb  3.0<L>  /  TBili  0.7  /  DBili  x   /  AST  24  /  ALT  10  /  AlkPhos  52  10-21    PT/INR - ( 19 Oct 2020 16:13 )   PT: 12.1 sec;   INR: 1.05 ratio         PTT - ( 19 Oct 2020 16:13 )  PTT:30.8 sec  Urinalysis Basic - ( 19 Oct 2020 16:33 )    Color: Yellow / Appearance: Clear / S.010 / pH: x  Gluc: x / Ketone: Negative  / Bili: Negative / Urobili: Negative mg/dL   Blood: x / Protein: 30 mg/dL / Nitrite: Negative   Leuk Esterase: Negative / RBC: 0-2 /HPF / WBC 0-2   Sq Epi: x / Non Sq Epi: Occasional / Bacteria: x        RADIOLOGY & ADDITIONAL TESTS:

## 2020-10-21 NOTE — PROGRESS NOTE ADULT - SUBJECTIVE AND OBJECTIVE BOX
[facility] Division of Hospital Medicine    Chief Complaint:  Sepsis and infected kidney stone    SUBJECTIVE / OVERNIGHT EVENTS: Patient seen and examined. has stent placement yesterday. Had few BMs overnight. Less back pain today.     Patient denies chest pain, SOB, abd pain, N/V, fever, chills, dysuria or any other complaints. All remainder ROS negative.     MEDICATIONS  (STANDING):  amLODIPine   Tablet 5 milliGRAM(s) Oral daily  cholecalciferol 400 Unit(s) Oral daily  donepezil 10 milliGRAM(s) Oral at bedtime  memantine 10 milliGRAM(s) Oral daily  multivitamin 1 Tablet(s) Oral daily  piperacillin/tazobactam IVPB.. 3.375 Gram(s) IV Intermittent every 8 hours  polyethylene glycol 3350 17 Gram(s) Oral daily  primidone 50 milliGRAM(s) Oral at bedtime  senna 2 Tablet(s) Oral at bedtime  sodium chloride 0.9%. 1000 milliLiter(s) (75 mL/Hr) IV Continuous <Continuous>  tamsulosin 0.4 milliGRAM(s) Oral at bedtime    MEDICATIONS  (PRN):  acetaminophen   Tablet .. 650 milliGRAM(s) Oral every 6 hours PRN Temp greater or equal to 38C (100.4F), Mild Pain (1 - 3), Moderate Pain (4 - 6)  bisacodyl 5 milliGRAM(s) Oral daily PRN Constipation  bisacodyl Suppository 10 milliGRAM(s) Rectal daily PRN Constipation  morphine  - Injectable 1 milliGRAM(s) IV Push every 6 hours PRN Severe Pain (7 - 10)  zolpidem 5 milliGRAM(s) Oral at bedtime PRN Insomnia        I&O's Summary    20 Oct 2020 07:01  -  21 Oct 2020 07:00  --------------------------------------------------------  IN: 240 mL / OUT: 200 mL / NET: 40 mL        PHYSICAL EXAM:  Vital Signs Last 24 Hrs  T(C): 36.6 (21 Oct 2020 06:30), Max: 37.8 (20 Oct 2020 21:10)  T(F): 97.8 (21 Oct 2020 06:30), Max: 100 (20 Oct 2020 21:10)  HR: 66 (21 Oct 2020 06:30) (66 - 100)  BP: 107/67 (21 Oct 2020 06:30) (102/50 - 164/66)  BP(mean): --  RR: 20 (21 Oct 2020 06:30) (14 - 20)  SpO2: 94% (21 Oct 2020 06:30) (94% - 100%)      CONSTITUTIONAL: NAD, well-developed, well-groomed  ENMT: Moist oral mucosa, no pharyngeal injection or exudates;  RESPIRATORY: Normal respiratory effort; lungs are clear to auscultation bilaterally  CARDIOVASCULAR: Regular rate and rhythm, normal S1 and S2, no murmur/rub/gallop; No lower extremity edema;  ABDOMEN: Nontender to palpation, normoactive bowel sounds, no rebound/guarding. no flank pain  MUSCLOSKELETAL:  Normal gait; no clubbing or cyanosis of digits; no joint swelling or tenderness to palpation  PSYCH: A+O to person, place, and time; affect appropriate  NEUROLOGY: CN 2-12 are intact and symmetric; no gross sensory deficits;   SKIN: No rashes; no palpable lesions    LABS:                        12.5   11.11 )-----------( 159      ( 21 Oct 2020 09:14 )             39.4     10-21    139  |  104  |  32.0<H>  ----------------------------<  123<H>  4.8   |  22.0  |  2.29<H>    Ca    8.7      21 Oct 2020 06:41  Mg     2.1     10-21    TPro  6.1<L>  /  Alb  3.0<L>  /  TBili  0.7  /  DBili  x   /  AST  24  /  ALT  10  /  AlkPhos  52  10-21    PT/INR - ( 19 Oct 2020 16:13 )   PT: 12.1 sec;   INR: 1.05 ratio         PTT - ( 19 Oct 2020 16:13 )  PTT:30.8 sec      Urinalysis Basic - ( 19 Oct 2020 16:33 )    Color: Yellow / Appearance: Clear / S.010 / pH: x  Gluc: x / Ketone: Negative  / Bili: Negative / Urobili: Negative mg/dL   Blood: x / Protein: 30 mg/dL / Nitrite: Negative   Leuk Esterase: Negative / RBC: 0-2 /HPF / WBC 0-2   Sq Epi: x / Non Sq Epi: Occasional / Bacteria: x        Culture - Urine (collected 19 Oct 2020 21:52)  Source: .Urine Clean Catch (Midstream)  Final Report (20 Oct 2020 20:04):    <10,000 CFU/mL Normal Urogenital Mago      CAPILLARY BLOOD GLUCOSE            RADIOLOGY & ADDITIONAL TESTS:  Results Reviewed:   Imaging Personally Reviewed:  Electrocardiogram Personally Reviewed:

## 2020-10-22 LAB
ALBUMIN SERPL ELPH-MCNC: 3.1 G/DL — LOW (ref 3.3–5.2)
ALP SERPL-CCNC: 51 U/L — SIGNIFICANT CHANGE UP (ref 40–120)
ALT FLD-CCNC: 9 U/L — SIGNIFICANT CHANGE UP
ANION GAP SERPL CALC-SCNC: 14 MMOL/L — SIGNIFICANT CHANGE UP (ref 5–17)
AST SERPL-CCNC: 23 U/L — SIGNIFICANT CHANGE UP
BILIRUB SERPL-MCNC: 0.3 MG/DL — LOW (ref 0.4–2)
BUN SERPL-MCNC: 34 MG/DL — HIGH (ref 8–20)
CALCIUM SERPL-MCNC: 8.6 MG/DL — SIGNIFICANT CHANGE UP (ref 8.6–10.2)
CHLORIDE SERPL-SCNC: 106 MMOL/L — SIGNIFICANT CHANGE UP (ref 98–107)
CO2 SERPL-SCNC: 22 MMOL/L — SIGNIFICANT CHANGE UP (ref 22–29)
CREAT SERPL-MCNC: 2.09 MG/DL — HIGH (ref 0.5–1.3)
GLUCOSE SERPL-MCNC: 121 MG/DL — HIGH (ref 70–99)
HCT VFR BLD CALC: 32.5 % — LOW (ref 39–50)
HGB BLD-MCNC: 10.4 G/DL — LOW (ref 13–17)
MAGNESIUM SERPL-MCNC: 2.1 MG/DL — SIGNIFICANT CHANGE UP (ref 1.6–2.6)
MCHC RBC-ENTMCNC: 31 PG — SIGNIFICANT CHANGE UP (ref 27–34)
MCHC RBC-ENTMCNC: 32 GM/DL — SIGNIFICANT CHANGE UP (ref 32–36)
MCV RBC AUTO: 96.7 FL — SIGNIFICANT CHANGE UP (ref 80–100)
PLATELET # BLD AUTO: 147 K/UL — LOW (ref 150–400)
POTASSIUM SERPL-MCNC: 4.1 MMOL/L — SIGNIFICANT CHANGE UP (ref 3.5–5.3)
POTASSIUM SERPL-SCNC: 4.1 MMOL/L — SIGNIFICANT CHANGE UP (ref 3.5–5.3)
PROT SERPL-MCNC: 6 G/DL — LOW (ref 6.6–8.7)
RBC # BLD: 3.36 M/UL — LOW (ref 4.2–5.8)
RBC # FLD: 12.6 % — SIGNIFICANT CHANGE UP (ref 10.3–14.5)
SODIUM SERPL-SCNC: 142 MMOL/L — SIGNIFICANT CHANGE UP (ref 135–145)
WBC # BLD: 7.66 K/UL — SIGNIFICANT CHANGE UP (ref 3.8–10.5)
WBC # FLD AUTO: 7.66 K/UL — SIGNIFICANT CHANGE UP (ref 3.8–10.5)

## 2020-10-22 PROCEDURE — 99232 SBSQ HOSP IP/OBS MODERATE 35: CPT

## 2020-10-22 RX ORDER — CEFUROXIME AXETIL 250 MG
500 TABLET ORAL EVERY 12 HOURS
Refills: 0 | Status: DISCONTINUED | OUTPATIENT
Start: 2020-10-22 | End: 2020-10-22

## 2020-10-22 RX ORDER — CEFUROXIME AXETIL 250 MG
250 TABLET ORAL EVERY 12 HOURS
Refills: 0 | Status: DISCONTINUED | OUTPATIENT
Start: 2020-10-22 | End: 2020-10-23

## 2020-10-22 RX ADMIN — PIPERACILLIN AND TAZOBACTAM 25 GRAM(S): 4; .5 INJECTION, POWDER, LYOPHILIZED, FOR SOLUTION INTRAVENOUS at 05:02

## 2020-10-22 RX ADMIN — PRIMIDONE 50 MILLIGRAM(S): 250 TABLET ORAL at 21:19

## 2020-10-22 RX ADMIN — Medication 250 MILLIGRAM(S): at 17:18

## 2020-10-22 RX ADMIN — SODIUM CHLORIDE 75 MILLILITER(S): 9 INJECTION INTRAMUSCULAR; INTRAVENOUS; SUBCUTANEOUS at 11:38

## 2020-10-22 RX ADMIN — POLYETHYLENE GLYCOL 3350 17 GRAM(S): 17 POWDER, FOR SOLUTION ORAL at 11:38

## 2020-10-22 RX ADMIN — Medication 1 TABLET(S): at 11:38

## 2020-10-22 RX ADMIN — DONEPEZIL HYDROCHLORIDE 10 MILLIGRAM(S): 10 TABLET, FILM COATED ORAL at 21:19

## 2020-10-22 RX ADMIN — Medication 400 UNIT(S): at 11:38

## 2020-10-22 RX ADMIN — MEMANTINE HYDROCHLORIDE 10 MILLIGRAM(S): 10 TABLET ORAL at 11:38

## 2020-10-22 RX ADMIN — TAMSULOSIN HYDROCHLORIDE 0.4 MILLIGRAM(S): 0.4 CAPSULE ORAL at 21:19

## 2020-10-22 NOTE — PROGRESS NOTE ADULT - SUBJECTIVE AND OBJECTIVE BOX
[facility] Division of Hospital Medicine    Chief Complaint: Sepsis and infected kidney stone    SUBJECTIVE / OVERNIGHT EVENTS: Patient seen and examined, no more back pain. Eating well. Remains with rob.     Patient denies chest pain, SOB, abd pain, N/V, fever, chills, dysuria or any other complaints. All remainder ROS negative.     MEDICATIONS  (STANDING):  cefuroxime   Tablet 250 milliGRAM(s) Oral every 12 hours  cholecalciferol 400 Unit(s) Oral daily  donepezil 10 milliGRAM(s) Oral at bedtime  memantine 10 milliGRAM(s) Oral daily  multivitamin 1 Tablet(s) Oral daily  polyethylene glycol 3350 17 Gram(s) Oral daily  primidone 50 milliGRAM(s) Oral at bedtime  sodium chloride 0.9%. 1000 milliLiter(s) (75 mL/Hr) IV Continuous <Continuous>  tamsulosin 0.4 milliGRAM(s) Oral at bedtime    MEDICATIONS  (PRN):  acetaminophen   Tablet .. 650 milliGRAM(s) Oral every 6 hours PRN Temp greater or equal to 38C (100.4F), Mild Pain (1 - 3), Moderate Pain (4 - 6)  bisacodyl 5 milliGRAM(s) Oral daily PRN Constipation  bisacodyl Suppository 10 milliGRAM(s) Rectal daily PRN Constipation  morphine  - Injectable 1 milliGRAM(s) IV Push every 6 hours PRN Severe Pain (7 - 10)  zolpidem 5 milliGRAM(s) Oral at bedtime PRN Insomnia        I&O's Summary    21 Oct 2020 07:01  -  22 Oct 2020 07:00  --------------------------------------------------------  IN: 520 mL / OUT: 2500 mL / NET: -1980 mL        PHYSICAL EXAM:  Vital Signs Last 24 Hrs  T(C): 36.8 (22 Oct 2020 07:49), Max: 37.1 (21 Oct 2020 17:39)  T(F): 98.3 (22 Oct 2020 07:49), Max: 98.7 (21 Oct 2020 17:39)  HR: 80 (22 Oct 2020 07:49) (74 - 80)  BP: 115/67 (22 Oct 2020 07:49) (115/67 - 148/66)  BP(mean): --  RR: 18 (22 Oct 2020 07:49) (18 - 18)  SpO2: 95% (22 Oct 2020 07:49) (95% - 98%)      CONSTITUTIONAL: NAD, well-developed, well-groomed  ENMT: Moist oral mucosa, no pharyngeal injection or exudates;  RESPIRATORY: Normal respiratory effort; lungs are clear to auscultation bilaterally  CARDIOVASCULAR: Regular rate and rhythm, normal S1 and S2, no murmur/rub/gallop; No lower extremity edema;  ABDOMEN: Nontender to palpation, normoactive bowel sounds, no rebound/guarding. no flank pain  MUSCLOSKELETAL:  Normal gait; no clubbing or cyanosis of digits; no joint swelling or tenderness to palpation  PSYCH: A+O to person, place, and time; affect appropriate  NEUROLOGY: CN 2-12 are intact and symmetric; no gross sensory deficits;   SKIN: No rashes; no palpable lesions    LABS:                        10.4   7.66  )-----------( 147      ( 22 Oct 2020 07:06 )             32.5     10-22    142  |  106  |  34.0<H>  ----------------------------<  121<H>  4.1   |  22.0  |  2.09<H>    Ca    8.6      22 Oct 2020 07:01  Mg     2.1     10-22    TPro  6.0<L>  /  Alb  3.1<L>  /  TBili  0.3<L>  /  DBili  x   /  AST  23  /  ALT  9   /  AlkPhos  51  10-22              Culture - Urine (collected 19 Oct 2020 21:52)  Source: .Urine Clean Catch (Midstream)  Final Report (20 Oct 2020 20:04):    <10,000 CFU/mL Normal Urogenital Mago    Culture - Blood (collected 19 Oct 2020 16:23)  Source: .Blood Blood-Peripheral  Preliminary Report (21 Oct 2020 17:03):    No growth at 48 hours    Culture - Blood (collected 19 Oct 2020 16:23)  Source: .Blood Blood-Peripheral  Preliminary Report (21 Oct 2020 17:03):    No growth at 48 hours      CAPILLARY BLOOD GLUCOSE            RADIOLOGY & ADDITIONAL TESTS:  Results Reviewed:   Imaging Personally Reviewed:  Electrocardiogram Personally Reviewed:

## 2020-10-22 NOTE — PROGRESS NOTE ADULT - ASSESSMENT
88yoM hx Alzheimer’s dementia, prostate CA (diagnosed 2010, s/p treatment, in remission), tremors, nephrolithiasis, CKD, HTN presenting with several days of right sided flank pain associated with episodic confusion and subjective fevers and chills, on admission found to have fever, leukocytosis and CT A/P showing 5mm R-mid ureteral calculus. He underwent right ureteral stent  placement on 10/20.    Sepsis due to infected renal stone  CT with 5 mm right mid ureteral calculus causing mild right hydroureteronephrosis  Urology consulted, s/p stent placement  ucx <10,000 urogenital ean   blood cultures NGTD  -Pain control with Tylenol and low dose morphine  - will transition to po antibioitcs and treat as complicated UTI for 10 days total,    ADITI on CKD  -Baseline Cr appears to be 1.4 to 1.7 in 2018  - Cr 2.0  - continue rob and IVFs  - monitor uop  - avoid nephrotoxins  - rob removal per Urology    HTN likely due to pain  -Not on any home BP agents, elevated BP on admission   -will stopped amlodipine and  monitor    Alzheimer’s dementia   -Aricept and namenda resumed    Constipation, resolved  - continue bowel regimen    Tremors  -Primidone resumed    Constipation  -CT A/P showing moderate fevers in colon  -Senna and Dulcolax PRN    Insomnia  -Zolpidem PRN (home med)    Prophylactic measure  -scds    Dispo: pending resolution of ADITI, likely dc tomorrow..

## 2020-10-23 ENCOUNTER — TRANSCRIPTION ENCOUNTER (OUTPATIENT)
Age: 85
End: 2020-10-23

## 2020-10-23 VITALS — DIASTOLIC BLOOD PRESSURE: 68 MMHG | SYSTOLIC BLOOD PRESSURE: 152 MMHG

## 2020-10-23 LAB
ANION GAP SERPL CALC-SCNC: 16 MMOL/L — SIGNIFICANT CHANGE UP (ref 5–17)
BUN SERPL-MCNC: 30 MG/DL — HIGH (ref 8–20)
CALCIUM SERPL-MCNC: 9.2 MG/DL — SIGNIFICANT CHANGE UP (ref 8.6–10.2)
CHLORIDE SERPL-SCNC: 110 MMOL/L — HIGH (ref 98–107)
CO2 SERPL-SCNC: 19 MMOL/L — LOW (ref 22–29)
CREAT SERPL-MCNC: 1.58 MG/DL — HIGH (ref 0.5–1.3)
GLUCOSE SERPL-MCNC: 167 MG/DL — HIGH (ref 70–99)
HCT VFR BLD CALC: 37.1 % — LOW (ref 39–50)
HGB BLD-MCNC: 11.8 G/DL — LOW (ref 13–17)
MAGNESIUM SERPL-MCNC: 1.8 MG/DL — SIGNIFICANT CHANGE UP (ref 1.6–2.6)
MCHC RBC-ENTMCNC: 30.7 PG — SIGNIFICANT CHANGE UP (ref 27–34)
MCHC RBC-ENTMCNC: 31.8 GM/DL — LOW (ref 32–36)
MCV RBC AUTO: 96.6 FL — SIGNIFICANT CHANGE UP (ref 80–100)
PLATELET # BLD AUTO: 203 K/UL — SIGNIFICANT CHANGE UP (ref 150–400)
POTASSIUM SERPL-MCNC: 3.6 MMOL/L — SIGNIFICANT CHANGE UP (ref 3.5–5.3)
POTASSIUM SERPL-SCNC: 3.6 MMOL/L — SIGNIFICANT CHANGE UP (ref 3.5–5.3)
RBC # BLD: 3.84 M/UL — LOW (ref 4.2–5.8)
RBC # FLD: 12.8 % — SIGNIFICANT CHANGE UP (ref 10.3–14.5)
SODIUM SERPL-SCNC: 145 MMOL/L — SIGNIFICANT CHANGE UP (ref 135–145)
WBC # BLD: 8.31 K/UL — SIGNIFICANT CHANGE UP (ref 3.8–10.5)
WBC # FLD AUTO: 8.31 K/UL — SIGNIFICANT CHANGE UP (ref 3.8–10.5)

## 2020-10-23 PROCEDURE — 85730 THROMBOPLASTIN TIME PARTIAL: CPT

## 2020-10-23 PROCEDURE — 85027 COMPLETE CBC AUTOMATED: CPT

## 2020-10-23 PROCEDURE — 76000 FLUOROSCOPY <1 HR PHYS/QHP: CPT

## 2020-10-23 PROCEDURE — 36415 COLL VENOUS BLD VENIPUNCTURE: CPT

## 2020-10-23 PROCEDURE — 84146 ASSAY OF PROLACTIN: CPT

## 2020-10-23 PROCEDURE — 85025 COMPLETE CBC W/AUTO DIFF WBC: CPT

## 2020-10-23 PROCEDURE — C1769: CPT

## 2020-10-23 PROCEDURE — 86769 SARS-COV-2 COVID-19 ANTIBODY: CPT

## 2020-10-23 PROCEDURE — 87635 SARS-COV-2 COVID-19 AMP PRB: CPT

## 2020-10-23 PROCEDURE — 83605 ASSAY OF LACTIC ACID: CPT

## 2020-10-23 PROCEDURE — 80053 COMPREHEN METABOLIC PANEL: CPT

## 2020-10-23 PROCEDURE — 99285 EMERGENCY DEPT VISIT HI MDM: CPT | Mod: 25

## 2020-10-23 PROCEDURE — 80048 BASIC METABOLIC PNL TOTAL CA: CPT

## 2020-10-23 PROCEDURE — 87040 BLOOD CULTURE FOR BACTERIA: CPT

## 2020-10-23 PROCEDURE — 99239 HOSP IP/OBS DSCHRG MGMT >30: CPT

## 2020-10-23 PROCEDURE — 87086 URINE CULTURE/COLONY COUNT: CPT

## 2020-10-23 PROCEDURE — C1758: CPT

## 2020-10-23 PROCEDURE — C2617: CPT

## 2020-10-23 PROCEDURE — 83735 ASSAY OF MAGNESIUM: CPT

## 2020-10-23 PROCEDURE — 74176 CT ABD & PELVIS W/O CONTRAST: CPT

## 2020-10-23 PROCEDURE — 81001 URINALYSIS AUTO W/SCOPE: CPT

## 2020-10-23 PROCEDURE — 96374 THER/PROPH/DIAG INJ IV PUSH: CPT

## 2020-10-23 PROCEDURE — 85610 PROTHROMBIN TIME: CPT

## 2020-10-23 RX ORDER — CEFUROXIME AXETIL 250 MG
1 TABLET ORAL
Qty: 12 | Refills: 0
Start: 2020-10-23 | End: 2020-10-28

## 2020-10-23 RX ORDER — TAMSULOSIN HYDROCHLORIDE 0.4 MG/1
1 CAPSULE ORAL
Qty: 14 | Refills: 0
Start: 2020-10-23 | End: 2020-11-05

## 2020-10-23 RX ORDER — MAGNESIUM SULFATE 500 MG/ML
1 VIAL (ML) INJECTION ONCE
Refills: 0 | Status: COMPLETED | OUTPATIENT
Start: 2020-10-23 | End: 2020-10-23

## 2020-10-23 RX ORDER — AMLODIPINE BESYLATE 2.5 MG/1
1 TABLET ORAL
Qty: 30 | Refills: 0
Start: 2020-10-23 | End: 2020-11-21

## 2020-10-23 RX ORDER — SACCHAROMYCES BOULARDII 250 MG
1 POWDER IN PACKET (EA) ORAL
Qty: 12 | Refills: 0
Start: 2020-10-23 | End: 2020-10-28

## 2020-10-23 RX ORDER — AMLODIPINE BESYLATE 2.5 MG/1
5 TABLET ORAL DAILY
Refills: 0 | Status: DISCONTINUED | OUTPATIENT
Start: 2020-10-23 | End: 2020-10-23

## 2020-10-23 RX ORDER — HYDRALAZINE HCL 50 MG
10 TABLET ORAL ONCE
Refills: 0 | Status: COMPLETED | OUTPATIENT
Start: 2020-10-23 | End: 2020-10-23

## 2020-10-23 RX ADMIN — Medication 1 TABLET(S): at 10:57

## 2020-10-23 RX ADMIN — POLYETHYLENE GLYCOL 3350 17 GRAM(S): 17 POWDER, FOR SOLUTION ORAL at 10:58

## 2020-10-23 RX ADMIN — AMLODIPINE BESYLATE 5 MILLIGRAM(S): 2.5 TABLET ORAL at 08:13

## 2020-10-23 RX ADMIN — ZOLPIDEM TARTRATE 5 MILLIGRAM(S): 10 TABLET ORAL at 01:55

## 2020-10-23 RX ADMIN — MEMANTINE HYDROCHLORIDE 10 MILLIGRAM(S): 10 TABLET ORAL at 10:57

## 2020-10-23 RX ADMIN — Medication 400 UNIT(S): at 10:58

## 2020-10-23 RX ADMIN — Medication 100 GRAM(S): at 10:57

## 2020-10-23 RX ADMIN — Medication 10 MILLIGRAM(S): at 05:38

## 2020-10-23 RX ADMIN — Medication 250 MILLIGRAM(S): at 05:08

## 2020-10-23 NOTE — DISCHARGE NOTE PROVIDER - NSDCMRMEDTOKEN_GEN_ALL_CORE_FT
Aricept 10 mg oral tablet: 1 tab(s) orally once a day (at bedtime)  Co Q-10 100 mg oral capsule:  orally 2 times a day  ergocalciferol: 350 milligram(s) orally once a day  Fish Oil: 1360 milligram(s) orally 2 times a day  glucosamine: 1 cap(s) orally 2 times a day  Multiple Vitamins oral tablet: 1 tab(s) orally once a day  Namenda 10 mg oral tablet:  orally once a day  primidone 50 mg oral tablet: orally once a day (at bedtime)  zolpidem 5 mg oral tablet: 1 tab(s) orally once a day (at bedtime)   Aricept 10 mg oral tablet: 1 tab(s) orally once a day (at bedtime)  Co Q-10 100 mg oral capsule:  orally 2 times a day  ergocalciferol: 350 milligram(s) orally once a day  Fish Oil: 1360 milligram(s) orally 2 times a day  glucosamine: 1 cap(s) orally 2 times a day  Multiple Vitamins oral tablet: 1 tab(s) orally once a day  Namenda 10 mg oral tablet:  orally once a day  primidone 50 mg oral tablet: orally once a day (at bedtime)  tamsulosin 0.4 mg oral capsule: 1 cap(s) orally once a day (at bedtime)  zolpidem 5 mg oral tablet: 1 tab(s) orally once a day (at bedtime)   amLODIPine 5 mg oral tablet: 1 tab(s) orally once a day  Aricept 10 mg oral tablet: 1 tab(s) orally once a day (at bedtime)  cefuroxime 250 mg oral tablet: 1 tab(s) orally every 12 hours  Co Q-10 100 mg oral capsule:  orally 2 times a day  ergocalciferol: 350 milligram(s) orally once a day  Fish Oil: 1360 milligram(s) orally 2 times a day  Florastor 250 mg oral capsule: 1 cap(s) orally 2 times a day   glucosamine: 1 cap(s) orally 2 times a day  Multiple Vitamins oral tablet: 1 tab(s) orally once a day  Namenda 10 mg oral tablet:  orally once a day  primidone 50 mg oral tablet: orally once a day (at bedtime)  tamsulosin 0.4 mg oral capsule: 1 cap(s) orally once a day (at bedtime)  zolpidem 5 mg oral tablet: 1 tab(s) orally once a day (at bedtime)

## 2020-10-23 NOTE — PROGRESS NOTE ADULT - ASSESSMENT
A/P:  ureteral stone  CAP    Stable CAP  patient s/p ureteral stent placement for stone  Ok for discharge when medically stable  follow up with Dr. Nice for definitive stone management

## 2020-10-23 NOTE — DISCHARGE NOTE PROVIDER - NSDCCPCAREPLAN_GEN_ALL_CORE_FT
PRINCIPAL DISCHARGE DIAGNOSIS  Diagnosis: Complicated UTI (urinary tract infection)  Assessment and Plan of Treatment: continue oral antibioitcs      SECONDARY DISCHARGE DIAGNOSES  Diagnosis: Hypertension  Assessment and Plan of Treatment: continue amlodipine    Diagnosis: Nephrolithiasis  Assessment and Plan of Treatment:      PRINCIPAL DISCHARGE DIAGNOSIS  Diagnosis: Complicated UTI (urinary tract infection)  Assessment and Plan of Treatment: continue oral antibioitcs for 6 more days      SECONDARY DISCHARGE DIAGNOSES  Diagnosis: Hypertension  Assessment and Plan of Treatment: continue amlodipine  follow up with PCP in 1-2 weeks for blood pressure check    Diagnosis: Nephrolithiasis  Assessment and Plan of Treatment: Follow up with dr. Nice in 1 week to schedule stent removal and possible lithotripsy.

## 2020-10-23 NOTE — DISCHARGE NOTE PROVIDER - NSDCFUADDAPPT_GEN_ALL_CORE_FT
Patient needs to follow up with Urology to schedule stent removal in 2- 4 weeks Patient needs to follow up with Urology to schedule stent removal in 1 week. Please call Dr. Servin office.

## 2020-10-23 NOTE — DISCHARGE NOTE NURSING/CASE MANAGEMENT/SOCIAL WORK - NSDCFUADDAPPT_GEN_ALL_CORE_FT
Patient needs to follow up with Urology to schedule stent removal in 1 week. Please call Dr. Servin office.

## 2020-10-23 NOTE — DISCHARGE NOTE PROVIDER - HOSPITAL COURSE
88yoM hx Alzheimer’s dementia, prostate CA (diagnosed 2010, s/p treatment, in remission), tremors, nephrolithiasis, CKD, HTN presenting with several days of right sided flank pain associated with episodic confusion and subjective fevers and chills, on admission found to have fever, leukocytosis and CT A/P showing 5mm R-mid ureteral calculus. he was started on broad spectrum IV antibiotics. Urology was consulted and He underwent right ureteral stent  placement on 10/20 with rob placement. Blood cx showed no growth and urine cx grew <10,000 urogentital ean. Given his fevers and leukocytosis will treat as complicated uti to complete 10 days of abx, Rob was removed.     35 minutes spent on discharge.      88yoM hx Alzheimer’s dementia, prostate CA (diagnosed 2010, s/p treatment, in remission), tremors, nephrolithiasis, CKD, HTN presenting with several days of right sided flank pain associated with episodic confusion and subjective fevers and chills, on admission found to have fever, leukocytosis and CT A/P showing 5mm R-mid ureteral calculus. he was started on broad spectrum IV antibiotics. Urology was consulted and He underwent right ureteral stent  placement on 10/20 with rob placement. Blood cx showed no growth and urine cx grew <10,000 urogentital ean. Given his fevers and leukocytosis will treat as complicated uti to complete 10 days of abx, Rob was removed. He is to follow up with dr. Nice in 1 week to schedule stent removal and possible lithotripsy.   He was started on amlodipine for high blood pressure. he is to follow up with his PCP in 1 week for blood pressure check.     Vital Signs Last 24 Hrs  T(C): 36.6 (23 Oct 2020 08:03), Max: 37 (22 Oct 2020 23:50)  T(F): 97.8 (23 Oct 2020 08:03), Max: 98.6 (22 Oct 2020 23:50)  HR: 94 (23 Oct 2020 08:03) (61 - 94)  BP: 156/67 (23 Oct 2020 08:03) (148/82 - 182/91)  BP(mean): --  RR: 18 (23 Oct 2020 08:03) (18 - 18)  SpO2: 97% (23 Oct 2020 08:03) (97% - 97%)    PHYSICAL EXAM:  Constitutional: No acute distress, alert and oriented by 3  HEENT: AT/NC, EOMI, PERRLA, Normal conjunctiva, no pharyngeal erythema, moist oral mucosa  Respiratory: CTA BL, equal breath sounds, no crackles or wheezing  Cardiovascular: RRR, no edema  Gastrointestinal: soft, Non-tender, Non-distended + Bowel sounds, no rebound or guarding  Musculoskeletal: No joint edema  Neurological: CN 2-12 grossly intact, no focal deficits  Skin: warm, dry and intact  Psychiatric: normal mood and affect        35 minutes spent on discharge.      88yoM hx Alzheimer’s dementia, prostate CA (diagnosed 2010, s/p treatment, in remission), tremors, nephrolithiasis, CKD, HTN presenting with several days of right sided flank pain associated with episodic confusion and subjective fevers and chills, on admission found to have fever, leukocytosis and CT A/P showing 5mm R-mid ureteral calculus. he was started on broad spectrum IV antibiotics. Urology was consulted and He underwent right ureteral stent  placement on 10/20 with rob placement. Blood cx showed no growth and urine cx grew <10,000 urogentital ean. Given his fevers and leukocytosis will treat as complicated uti to complete 10 days of abx, Rob was removed and he voided without difficulty. He is to follow up with dr. Nice in 1 week to schedule stent removal and possible lithotripsy.   He was started on amlodipine for high blood pressure. he is to follow up with his PCP in 1 week for blood pressure check.     Vital Signs Last 24 Hrs  T(C): 36.6 (23 Oct 2020 08:03), Max: 37 (22 Oct 2020 23:50)  T(F): 97.8 (23 Oct 2020 08:03), Max: 98.6 (22 Oct 2020 23:50)  HR: 94 (23 Oct 2020 08:03) (61 - 94)  BP: 156/67 (23 Oct 2020 08:03) (148/82 - 182/91)  BP(mean): --  RR: 18 (23 Oct 2020 08:03) (18 - 18)  SpO2: 97% (23 Oct 2020 08:03) (97% - 97%)    PHYSICAL EXAM:  Constitutional: No acute distress, alert and oriented by 3  HEENT: AT/NC, EOMI, PERRLA, Normal conjunctiva, no pharyngeal erythema, moist oral mucosa  Respiratory: CTA BL, equal breath sounds, no crackles or wheezing  Cardiovascular: RRR, no edema  Gastrointestinal: soft, Non-tender, Non-distended + Bowel sounds, no rebound or guarding  Musculoskeletal: No joint edema  Neurological: CN 2-12 grossly intact, no focal deficits  Skin: warm, dry and intact  Psychiatric: normal mood and affect        35 minutes spent on discharge.

## 2020-10-23 NOTE — DISCHARGE NOTE PROVIDER - PROVIDER TOKENS
PROVIDER:[TOKEN:[[id number]],FOLLOWUP:[2 weeks]],PROVIDER:[TOKEN:[[id number]],FOLLOWUP:[2 weeks]] PROVIDER:[TOKEN:[[id number]],FOLLOWUP:[1 week]],PROVIDER:[TOKEN:[[id number]],FOLLOWUP:[2 weeks]]

## 2020-10-23 NOTE — PROGRESS NOTE ADULT - SUBJECTIVE AND OBJECTIVE BOX
INTERVAL HPI/OVERNIGHT EVENTS: Patient resting without complaints.  He is voiding well/clear    MEDICATIONS  (STANDING):  amLODIPine   Tablet 5 milliGRAM(s) Oral daily  cefuroxime   Tablet 250 milliGRAM(s) Oral every 12 hours  cholecalciferol 400 Unit(s) Oral daily  donepezil 10 milliGRAM(s) Oral at bedtime  memantine 10 milliGRAM(s) Oral daily  multivitamin 1 Tablet(s) Oral daily  polyethylene glycol 3350 17 Gram(s) Oral daily  primidone 50 milliGRAM(s) Oral at bedtime  tamsulosin 0.4 milliGRAM(s) Oral at bedtime    MEDICATIONS  (PRN):  acetaminophen   Tablet .. 650 milliGRAM(s) Oral every 6 hours PRN Temp greater or equal to 38C (100.4F), Mild Pain (1 - 3), Moderate Pain (4 - 6)  bisacodyl 5 milliGRAM(s) Oral daily PRN Constipation  bisacodyl Suppository 10 milliGRAM(s) Rectal daily PRN Constipation  morphine  - Injectable 1 milliGRAM(s) IV Push every 6 hours PRN Severe Pain (7 - 10)  zolpidem 5 milliGRAM(s) Oral at bedtime PRN Insomnia      Allergies    No Known Allergies    Intolerances        Vital Signs Last 24 Hrs  T(C): 36.7 (23 Oct 2020 15:25), Max: 37 (22 Oct 2020 23:50)  T(F): 98 (23 Oct 2020 15:25), Max: 98.6 (22 Oct 2020 23:50)  HR: 92 (23 Oct 2020 15:25) (61 - 94)  BP: 165/70 (23 Oct 2020 15:25) (148/82 - 182/91)  BP(mean): --  RR: 18 (23 Oct 2020 15:25) (18 - 18)  SpO2: 95% (23 Oct 2020 15:25) (95% - 97%)    ROS:  Constitutional: No fever, weight loss or fatigue  Respiratory: No cough, wheezing, chills or hemoptysis  Cardiovascular: No chest pain, palpitations, shortness of breath, dizziness or leg swelling  Genitourinary: No dysuria, frequency, hematuria or incontinence  Skin: No itching, burning, rashes or lesions   Musculoskeletal: No joint pain or swelling; No muscle, back or extremity pain  Psychiatric: No depression, anxiety, mood swings or difficulty sleeping  Allergy and Immunologic: No hives or eczema     ON PE:  General: Well developed; well nourished; in no acute distress  Head: Normocephalic; atraumatic  Respiratory: No tachypnea  Gastrointestinal: Soft non-tender non-distended;  Genitourinary: No costovertebral angle tenderness.  Urinary bladder is clinically not distended  Extremities: Normal range of motion, No edema  Neurological: Alert and oriented x4  Skin: Warm and dry. No acute rash  Psychiatric: Cooperative and appropriate      LABS:                        11.8   8.31  )-----------( 203      ( 23 Oct 2020 09:09 )             37.1     10-23    145  |  110<H>  |  30.0<H>  ----------------------------<  167<H>  3.6   |  19.0<L>  |  1.58<H>    Ca    9.2      23 Oct 2020 09:09  Mg     1.8     10-23    TPro  6.0<L>  /  Alb  3.1<L>  /  TBili  0.3<L>  /  DBili  x   /  AST  23  /  ALT  9   /  AlkPhos  51  10-22          RADIOLOGY & ADDITIONAL TESTS:

## 2020-10-23 NOTE — DISCHARGE NOTE PROVIDER - CARE PROVIDER_API CALL
Trevor Nice  UROLOGY  [address] [place] [address]  Phone: [phone]  Fax: [phone]  Follow Up Time: 2 weeks    Ladinsky, Michael A  FAMILY MEDICINE  [address] [place] [address], [address]  [place] [address]  Phone: [phone]  Fax: [phone]  Follow Up Time: 2 weeks   Trevor Nice  UROLOGY  [address] [place] [address]  Phone: [phone]  Fax: [phone]  Follow Up Time: 1 week    Ladinsky, Michael A  FAMILY MEDICINE  [address] [place] [address], [address]  [place] [address]  Phone: [phone]  Fax: [phone]  Follow Up Time: 2 weeks

## 2020-10-23 NOTE — PROGRESS NOTE ADULT - REASON FOR ADMISSION
sepsis, infected renal stone

## 2020-10-23 NOTE — DISCHARGE NOTE NURSING/CASE MANAGEMENT/SOCIAL WORK - PATIENT PORTAL LINK FT
You can access the FollowMyHealth Patient Portal offered by [facility] by registering at the following website: [email or web address]. By joining [facility]’s FollowMyHealth portal, you will also be able to view your health information using other applications (apps) compatible with our system.

## 2020-10-24 LAB
CULTURE RESULTS: SIGNIFICANT CHANGE UP
CULTURE RESULTS: SIGNIFICANT CHANGE UP
SPECIMEN SOURCE: SIGNIFICANT CHANGE UP
SPECIMEN SOURCE: SIGNIFICANT CHANGE UP

## 2020-11-23 ENCOUNTER — OUTPATIENT (OUTPATIENT)
Dept: OUTPATIENT SERVICES | Facility: HOSPITAL | Age: 85
LOS: 1 days | End: 2020-11-23
Payer: MEDICARE

## 2020-11-23 VITALS
RESPIRATION RATE: 16 BRPM | TEMPERATURE: 99 F | HEIGHT: 66 IN | HEART RATE: 66 BPM | SYSTOLIC BLOOD PRESSURE: 192 MMHG | DIASTOLIC BLOOD PRESSURE: 84 MMHG | WEIGHT: 163.8 LBS

## 2020-11-23 DIAGNOSIS — I10 ESSENTIAL (PRIMARY) HYPERTENSION: ICD-10-CM

## 2020-11-23 DIAGNOSIS — Z90.49 ACQUIRED ABSENCE OF OTHER SPECIFIED PARTS OF DIGESTIVE TRACT: Chronic | ICD-10-CM

## 2020-11-23 DIAGNOSIS — Z29.9 ENCOUNTER FOR PROPHYLACTIC MEASURES, UNSPECIFIED: ICD-10-CM

## 2020-11-23 DIAGNOSIS — Z98.89 OTHER SPECIFIED POSTPROCEDURAL STATES: Chronic | ICD-10-CM

## 2020-11-23 DIAGNOSIS — G30.9 ALZHEIMER'S DISEASE, UNSPECIFIED: ICD-10-CM

## 2020-11-23 DIAGNOSIS — G47.30 SLEEP APNEA, UNSPECIFIED: ICD-10-CM

## 2020-11-23 DIAGNOSIS — Z01.818 ENCOUNTER FOR OTHER PREPROCEDURAL EXAMINATION: ICD-10-CM

## 2020-11-23 DIAGNOSIS — N20.0 CALCULUS OF KIDNEY: ICD-10-CM

## 2020-11-23 LAB
ANION GAP SERPL CALC-SCNC: 14 MMOL/L — SIGNIFICANT CHANGE UP (ref 5–17)
APPEARANCE UR: CLEAR — SIGNIFICANT CHANGE UP
BACTERIA # UR AUTO: ABNORMAL
BILIRUB UR-MCNC: NEGATIVE — SIGNIFICANT CHANGE UP
BUN SERPL-MCNC: 23 MG/DL — HIGH (ref 8–20)
CALCIUM SERPL-MCNC: 9.4 MG/DL — SIGNIFICANT CHANGE UP (ref 8.6–10.2)
CHLORIDE SERPL-SCNC: 106 MMOL/L — SIGNIFICANT CHANGE UP (ref 98–107)
CO2 SERPL-SCNC: 23 MMOL/L — SIGNIFICANT CHANGE UP (ref 22–29)
COLOR SPEC: YELLOW — SIGNIFICANT CHANGE UP
CREAT SERPL-MCNC: 1.38 MG/DL — HIGH (ref 0.5–1.3)
DIFF PNL FLD: ABNORMAL
EPI CELLS # UR: SIGNIFICANT CHANGE UP
GLUCOSE SERPL-MCNC: 98 MG/DL — SIGNIFICANT CHANGE UP (ref 70–99)
GLUCOSE UR QL: NEGATIVE MG/DL — SIGNIFICANT CHANGE UP
HCT VFR BLD CALC: 36 % — LOW (ref 39–50)
HGB BLD-MCNC: 11.3 G/DL — LOW (ref 13–17)
KETONES UR-MCNC: NEGATIVE — SIGNIFICANT CHANGE UP
LEUKOCYTE ESTERASE UR-ACNC: ABNORMAL
MCHC RBC-ENTMCNC: 30.3 PG — SIGNIFICANT CHANGE UP (ref 27–34)
MCHC RBC-ENTMCNC: 31.4 GM/DL — LOW (ref 32–36)
MCV RBC AUTO: 96.5 FL — SIGNIFICANT CHANGE UP (ref 80–100)
NITRITE UR-MCNC: NEGATIVE — SIGNIFICANT CHANGE UP
PH UR: 5 — SIGNIFICANT CHANGE UP (ref 5–8)
PLATELET # BLD AUTO: 187 K/UL — SIGNIFICANT CHANGE UP (ref 150–400)
POTASSIUM SERPL-MCNC: 4.4 MMOL/L — SIGNIFICANT CHANGE UP (ref 3.5–5.3)
POTASSIUM SERPL-SCNC: 4.4 MMOL/L — SIGNIFICANT CHANGE UP (ref 3.5–5.3)
PROT UR-MCNC: 100 MG/DL
RBC # BLD: 3.73 M/UL — LOW (ref 4.2–5.8)
RBC # FLD: 13.1 % — SIGNIFICANT CHANGE UP (ref 10.3–14.5)
RBC CASTS # UR COMP ASSIST: >50 /HPF (ref 0–4)
SODIUM SERPL-SCNC: 143 MMOL/L — SIGNIFICANT CHANGE UP (ref 135–145)
SP GR SPEC: 1.01 — SIGNIFICANT CHANGE UP (ref 1.01–1.02)
UROBILINOGEN FLD QL: NEGATIVE MG/DL — SIGNIFICANT CHANGE UP
WBC # BLD: 7.99 K/UL — SIGNIFICANT CHANGE UP (ref 3.8–10.5)
WBC # FLD AUTO: 7.99 K/UL — SIGNIFICANT CHANGE UP (ref 3.8–10.5)
WBC UR QL: SIGNIFICANT CHANGE UP

## 2020-11-23 PROCEDURE — 93010 ELECTROCARDIOGRAM REPORT: CPT

## 2020-11-23 RX ORDER — OMEGA-3 ACID ETHYL ESTERS 1 G
1360 CAPSULE ORAL
Qty: 0 | Refills: 0 | DISCHARGE

## 2020-11-23 RX ORDER — CEFAZOLIN SODIUM 1 G
2000 VIAL (EA) INJECTION ONCE
Refills: 0 | Status: DISCONTINUED | OUTPATIENT
Start: 2020-12-03 | End: 2020-12-17

## 2020-11-23 NOTE — ASU PATIENT PROFILE, ADULT - ABILITY TO HEAR (WITH HEARING AID OR HEARING APPLIANCE IF NORMALLY USED):
Mildly to Moderately Impaired: difficulty hearing in some environments or speaker may need to increase volume or speak distinctly/hearing aid left ear only

## 2020-11-23 NOTE — H&P PST ADULT - HISTORY OF PRESENT ILLNESS
88yoM hx Alzheimer’s dementia, prostate CA (diagnosed 2010, s/p treatment, in remission), tremors, nephrolithiasis, CKD, HTN, presenting with flank pain and fever.  Pt AOx3 and able to provide meaningful hx.  Pt reports several day hx of sharp right sided flank pain associated with episodic confusion and subjective fevers and chills.  Pt reports chronic urinary frequency which he attributes to a prior prostate procedure.  He denies any nausea, vomiting, hematuria, dysuria.  Pt reporting some constipation with last BM about 3-4 days ago.    On admission, pt febrile, tachycardic, hypertensive.  UA negative. Labs notable for WBC 16.5. CT A/P showed 5 mm right mid ureteral calculus causing mild right hydroureteronephrosis.  In ED, pt received 1L and Zosyn. 88yoM hx Alzheimer’s dementia, prostate CA (diagnosed 2010, s/p treatment, in remission), tremors, nephrolithiasis, CKD, HTN who was admitted in [facility] last month with flank pain and fever, CT A/P showed 5 mm right mid ureteral calculus causing mild right hydroureteronephrosis. He had stent placed during that time,  today he is here in PST accompanied by his daughter. He is scheduled for a Cystoscopy right retrograde pyelogram ureteral pyeloscopy laser lithotripsy stone extraction, stent exchange.

## 2020-11-23 NOTE — ASU PATIENT PROFILE, ADULT - ARRIVAL TIME
This note was copied from a baby's chart. LC to room. Mother and Father want to discuss feeding plan. Both parents feel like exclusive pumping might be a better fit for their family. Parents state that breastfeeding is making mother very anxious and they have worked very hard to overcome her anxiety problems. We discussed exclusive pumping at length. After discussion, parents would like to go ahead and do exclusive pumping care plan. Mother feels like her flanges might be too small. I wrote my name and number on white board and encouraged mother to call the next time she pumps to find the right fit. Mother states understanding of all information and denies further needs at this time. This note was copied from a baby's chart. LC to room. Mother and RN attempted to breastfeed infant, infant not latching well and drops where expressed to infant. LC noted infant skin to skin with mother and showing cues again. LC suggested attempting again in [address] position, mother agreed. Infant latched well after a couple of attempts. Mother states latch feels like slight pulling/tugging and denies any pain/discomfort with latch at this time. [facility] taught mother breast compressions to aide in feeding duration and milk transfer. [facility] taught and mother returned demo for recognizing swallows (visual and/or audible) and satiety. Mother denies any further needs at this time. This note was copied from a baby's chart. LC to room. Mother states she is not getting much in pump yet. Encouragement and support given. Encouraged mother to continue pumping at least 8 times per 24 hours using hands on pumping techniques. Answered all parents questions about exclusive pumping and bottle feeding. Encouraged mother to call after discharge with any lactation/pumping questions. Mother states understanding of all information and denies further needs at this time. for next feeding. 12:00

## 2020-11-23 NOTE — ASU PATIENT PROFILE, ADULT - LEARNING ASSESSMENT (PATIENT) ADDITIONAL COMMENTS
NP, instructed pt on pre-op instructions/teaching, tips for safer surgery, pain management scale, covid swab appt info 11

## 2020-11-23 NOTE — H&P PST ADULT - NSICDXPASTMEDICALHX_GEN_ALL_CORE_FT
PAST MEDICAL HISTORY:  Alzheimers disease     [place] (hard of hearing)     HTN (hypertension)     Hypertension     Kidney stone     Kidney stones     Prostate cancer     Sleep apnea      PAST MEDICAL HISTORY:  Alzheimers disease     Gastric ulcer     [place] (hard of hearing) has B/L hearing aid    HTN (hypertension)     Hypertension     Kidney stones     Prostate cancer 2013    Sleep apnea use  CPAP machine

## 2020-11-23 NOTE — H&P PST ADULT - ASSESSMENT
88 year old male with kidney stone scheduled for a Cystoscopy right retrograde pyelogram ureteral pyeloscopy laser lithotripsy stone extraction, stent exchange. medications reviewed, instructions given on what medications to take and what not to take. Asked the pt not to take any NSAID's 5-7 days before surgery and told the pt Tylenol is okay to take for pain, pt verbalized understanding. he needs cardiac clearance pending, daughter will call back with name and number of the cardiologist. he has Alzheimer's, daughter will accompany him on DOP. not on any ASA but asked to hold fish oil, MVI and CoQ for 5-7 days before the surgery.    CAPRINI VTE 2.0 SCORE [CLOT updated 2019]    AGE RELATED RISK FACTORS                                                       MOBILITY RELATED FACTORS  [ ] Age 41-60 years                                            (1 Point)                    [ ] Bed rest                                                        (1 Point)  [ ] Age: 61-74 years                                           (2 Points)                  [ ] Plaster cast                                                   (2 Points)  [x ] Age= 75 years                                              (3 Points)                    [ ] Bed bound for more than 72 hours                 (2 Points)    DISEASE RELATED RISK FACTORS                                               GENDER SPECIFIC FACTORS  [x ] Edema in the lower extremities                       (1 Point)              [ ] Pregnancy                                                     (1 Point)  [ ] Varicose veins                                               (1 Point)                     [ ] Post-partum < 6 weeks                                   (1 Point)             [x ] BMI > 25 Kg/m2                                            (1 Point)                     [ ] Hormonal therapy  or oral contraception          (1 Point)                 [ ] Sepsis (in the previous month)                        (1 Point)               [ ] History of pregnancy complications                 (1 point)  [ ] Pneumonia or serious lung disease                                               [ ] Unexplained or recurrent                     (1 Point)           (in the previous month)                               (1 Point)  [ ] Abnormal pulmonary function test                     (1 Point)                 SURGERY RELATED RISK FACTORS  [ ] Acute myocardial infarction                              (1 Point)               [ ]  Section                                             (1 Point)  [ ] Congestive heart failure (in the previous month)  (1 Point)      [ ] Minor surgery                                                  (1 Point)   [ ] Inflammatory bowel disease                             (1 Point)               [ ] Arthroscopic surgery                                        (2 Points)  [ ] Central venous access                                      (2 Points)                [x ] General surgery lasting more than 45 minutes (2 points)  [x ] Malignancy- Present or previous                   (2 Points)                [ ] Elective arthroplasty                                         (5 points)    [ ] Stroke (in the previous month)                          (5 Points)                                                                                                                                                           HEMATOLOGY RELATED FACTORS                                                 TRAUMA RELATED RISK FACTORS  [ ] Prior episodes of VTE                                     (3 Points)                [ ] Fracture of the hip, pelvis, or leg                       (5 Points)  [ ] Positive family history for VTE                         (3 Points)             [ ] Acute spinal cord injury (in the previous month)  (5 Points)  [ ] Prothrombin 32675 A                                     (3 Points)               [ ] Paralysis  (less than 1 month)                             (5 Points)  [ ] Factor V Leiden                                             (3 Points)                  [ ] Multiple Trauma within 1 month                        (5 Points)  [ ] Lupus anticoagulants                                     (3 Points)                                                           [ ] Anticardiolipin antibodies                               (3 Points)                                                       [ ] High homocysteine in the blood                      (3 Points)                                             [ ] Other congenital or acquired thrombophilia      (3 Points)                                                [ ] Heparin induced thrombocytopenia                  (3 Points)                                     Total Score [   9       ]    OPIOID RISK TOOL    JORDAN EACH BOX THAT APPLIES AND ADD TOTALS AT THE END    FAMILY HISTORY OF SUBSTANCE ABUSE                 FEMALE         MALE                                                Alcohol                             [  ]1 pt          [  ]3pts                                               Illegal Drugs                     [  ]2 pts        [  ]3pts                                               Rx Drugs                           [  ]4 pts        [  ]4 pts    PERSONAL HISTORY OF SUBSTANCE ABUSE                                                                                          Alcohol                             [  ]3 pts       [  ]3 pts                                               Illegal Drugs                     [  ]4 pts        [  ]4 pts                                               Rx Drugs                           [  ]5 pts        [  ]5 pts    AGE BETWEEN 16-45 YEARS                                      [  ]1 pt         [  ]1 pt    HISTORY OF PREADOLESCENT   SEXUAL ABUSE                                                             [  ]3 pts        [  ]0pts    PSYCHOLOGICAL DISEASE                     ADD, OCD, Bipolar, Schizophrenia        [  ]2 pts         [  ]2 pts                      Depression                                               [  ]1 pt           [  ]1 pt           SCORING TOTAL   (add numbers and type here)              ( 0)                                     A score of 3 or lower indicated LOW risk for future opioid abuse  A score of 4 to 7 indicated moderate risk for future opioid abuse  A score of 8 or higher indicates a high risk for opioid abuse

## 2020-11-23 NOTE — H&P PST ADULT - NSICDXPROBLEM_GEN_ALL_CORE_FT
PROBLEM DIAGNOSES  Problem: Sleep apnea  Assessment and Plan: continue using the CPAP machine.    Problem: Alzheimer disease  Assessment and Plan: continue medications as instructed. daughter to Accompany the patient on DOP    Problem: Need for prophylactic measure  Assessment and Plan: Caprini Score 9 High risk,  Surgical team should assess /Strongly recommend pharmacological and mechanical measures for VTE prophylaxis     Problem: HTN (hypertension)  Assessment and Plan: not on meds now, sent for cardiac clearance pending, Asked the patient to take the Blood pressure medication/ heart medication on DOP if its prescribed by the cardiologist or PCP    Problem: Kidney stone  Assessment and Plan: Cystoscopy right retrograde pyelogram ureteral pyeloscopy laser lithotripsy stone extraction, stent exchange. Medical and cardiac clearance pending

## 2020-11-23 NOTE — H&P PST ADULT - EKG AND INTERPRETATION
EKG reviewed, no acute changes, official reading pending. Sinus christa with PAC, EkG slightly abnormal from last one done on 3/20, patient has HTN not on any meds  now, heard a cardiac murmur 3/6, sent for cardiac clearance pending, pt's daughter will call back with name and number

## 2020-11-23 NOTE — ASU PATIENT PROFILE, ADULT - PMH
Alzheimers disease    [place] (hard of hearing)    HTN (hypertension)    Hypertension    Kidney stone    Kidney stones    Prostate cancer    Sleep apnea

## 2020-11-24 PROBLEM — K25.9 GASTRIC ULCER, UNSPECIFIED AS ACUTE OR CHRONIC, WITHOUT HEMORRHAGE OR PERFORATION: Chronic | Status: ACTIVE | Noted: 2020-11-23

## 2020-11-24 LAB
CULTURE RESULTS: SIGNIFICANT CHANGE UP
SPECIMEN SOURCE: SIGNIFICANT CHANGE UP

## 2020-11-30 ENCOUNTER — APPOINTMENT (OUTPATIENT)
Dept: DISASTER EMERGENCY | Facility: CLINIC | Age: 85
End: 2020-11-30

## 2020-11-30 DIAGNOSIS — Z01.818 ENCOUNTER FOR OTHER PREPROCEDURAL EXAMINATION: ICD-10-CM

## 2020-12-01 LAB — SARS-COV-2 N GENE NPH QL NAA+PROBE: NOT DETECTED

## 2020-12-02 ENCOUNTER — TRANSCRIPTION ENCOUNTER (OUTPATIENT)
Age: 85
End: 2020-12-02

## 2020-12-03 ENCOUNTER — RESULT REVIEW (OUTPATIENT)
Age: 85
End: 2020-12-03

## 2020-12-03 ENCOUNTER — OUTPATIENT (OUTPATIENT)
Dept: OUTPATIENT SERVICES | Facility: HOSPITAL | Age: 85
LOS: 1 days | End: 2020-12-03
Payer: MEDICARE

## 2020-12-03 VITALS
SYSTOLIC BLOOD PRESSURE: 176 MMHG | WEIGHT: 163.8 LBS | OXYGEN SATURATION: 99 % | TEMPERATURE: 98 F | DIASTOLIC BLOOD PRESSURE: 73 MMHG | HEIGHT: 66 IN | HEART RATE: 75 BPM | RESPIRATION RATE: 16 BRPM

## 2020-12-03 VITALS
TEMPERATURE: 97 F | RESPIRATION RATE: 15 BRPM | DIASTOLIC BLOOD PRESSURE: 98 MMHG | SYSTOLIC BLOOD PRESSURE: 146 MMHG | HEART RATE: 77 BPM | OXYGEN SATURATION: 98 %

## 2020-12-03 DIAGNOSIS — Z98.89 OTHER SPECIFIED POSTPROCEDURAL STATES: Chronic | ICD-10-CM

## 2020-12-03 DIAGNOSIS — Z90.49 ACQUIRED ABSENCE OF OTHER SPECIFIED PARTS OF DIGESTIVE TRACT: Chronic | ICD-10-CM

## 2020-12-03 DIAGNOSIS — N20.0 CALCULUS OF KIDNEY: ICD-10-CM

## 2020-12-03 PROCEDURE — 82365 CALCULUS SPECTROSCOPY: CPT

## 2020-12-03 PROCEDURE — 76000 FLUOROSCOPY <1 HR PHYS/QHP: CPT

## 2020-12-03 PROCEDURE — C1769: CPT

## 2020-12-03 PROCEDURE — C2617: CPT

## 2020-12-03 PROCEDURE — 52356 CYSTO/URETERO W/LITHOTRIPSY: CPT | Mod: RT

## 2020-12-03 PROCEDURE — 88300 SURGICAL PATH GROSS: CPT

## 2020-12-03 PROCEDURE — 93005 ELECTROCARDIOGRAM TRACING: CPT

## 2020-12-03 PROCEDURE — C1889: CPT

## 2020-12-03 PROCEDURE — 88300 SURGICAL PATH GROSS: CPT | Mod: 26

## 2020-12-03 PROCEDURE — C1758: CPT

## 2020-12-03 RX ORDER — HYDROMORPHONE HYDROCHLORIDE 2 MG/ML
0.5 INJECTION INTRAMUSCULAR; INTRAVENOUS; SUBCUTANEOUS
Refills: 0 | Status: DISCONTINUED | OUTPATIENT
Start: 2020-12-03 | End: 2020-12-03

## 2020-12-03 RX ORDER — PRIMIDONE 250 MG/1
0 TABLET ORAL
Qty: 0 | Refills: 0 | DISCHARGE

## 2020-12-03 RX ORDER — ONDANSETRON 8 MG/1
4 TABLET, FILM COATED ORAL ONCE
Refills: 0 | Status: DISCONTINUED | OUTPATIENT
Start: 2020-12-03 | End: 2020-12-03

## 2020-12-03 RX ORDER — ERGOCALCIFEROL 1.25 MG/1
350 CAPSULE ORAL
Qty: 0 | Refills: 0 | DISCHARGE

## 2020-12-03 RX ORDER — SODIUM CHLORIDE 9 MG/ML
1000 INJECTION, SOLUTION INTRAVENOUS
Refills: 0 | Status: DISCONTINUED | OUTPATIENT
Start: 2020-12-03 | End: 2020-12-03

## 2020-12-03 RX ORDER — SODIUM CHLORIDE 9 MG/ML
3 INJECTION INTRAMUSCULAR; INTRAVENOUS; SUBCUTANEOUS ONCE
Refills: 0 | Status: COMPLETED | OUTPATIENT
Start: 2020-12-03 | End: 2020-12-03

## 2020-12-03 RX ADMIN — SODIUM CHLORIDE 3 MILLILITER(S): 9 INJECTION INTRAMUSCULAR; INTRAVENOUS; SUBCUTANEOUS at 12:05

## 2020-12-03 NOTE — ASU DISCHARGE PLAN (ADULT/PEDIATRIC) - ACCOMPANIED BY
discharged to lobby via w/c family not present due to covid restrictions discharged to lobby via w/c family not present due to covid restrictions/Family

## 2020-12-03 NOTE — ASU DISCHARGE PLAN (ADULT/PEDIATRIC) - ASU DC SPECIAL INSTRUCTIONSFT
It is common to have blood in the urine after your procedure. It may be pink or red.  If you have significant amount of clots in urine. Drink plenty of liquids.    Do not perform strenous activities or resume sexual activity until you are cleared by your doctor.

## 2020-12-03 NOTE — ASU DISCHARGE PLAN (ADULT/PEDIATRIC) - NURSING INSTRUCTIONS
You were given IV Tylenol for pain management.  Please DO NOT take tylenol or products that contain tylenol for the next 6-8 hours (until 9:00pm ). Please do not exceed 4000mg in 24hours.

## 2020-12-03 NOTE — ASU DISCHARGE PLAN (ADULT/PEDIATRIC) - CALL YOUR DOCTOR IF YOU HAVE ANY OF THE FOLLOWING:
Bleeding that does not stop Inability to tolerate liquids or foods/Bleeding that does not stop/Nausea and vomiting that does not stop/Fever greater than (need to indicate Fahrenheit or Celsius)/Wound/Surgical Site with redness, or foul smelling discharge or pus

## 2020-12-03 NOTE — ASU DISCHARGE PLAN (ADULT/PEDIATRIC) - CARE PROVIDER_API CALL
Trevor Nice  UROLOGY  [address] [place] [address]  Phone: [phone]  Fax: [phone]  Follow Up Time:

## 2020-12-09 LAB — NIDUS STONE QN: SIGNIFICANT CHANGE UP

## 2020-12-11 LAB — SURGICAL PATHOLOGY STUDY: SIGNIFICANT CHANGE UP

## 2021-11-09 ENCOUNTER — INPATIENT (INPATIENT)
Facility: HOSPITAL | Age: 86
LOS: 0 days | Discharge: HOME | DRG: 552 | End: 2021-11-10
Attending: HOSPITALIST | Admitting: HOSPITALIST
Payer: MEDICARE

## 2021-11-09 VITALS
RESPIRATION RATE: 20 BRPM | WEIGHT: 164.91 LBS | HEART RATE: 75 BPM | SYSTOLIC BLOOD PRESSURE: 199 MMHG | DIASTOLIC BLOOD PRESSURE: 78 MMHG | HEIGHT: 66 IN | OXYGEN SATURATION: 98 % | TEMPERATURE: 101 F

## 2021-11-09 DIAGNOSIS — G03.9 MENINGITIS, UNSPECIFIED: ICD-10-CM

## 2021-11-09 DIAGNOSIS — Z90.49 ACQUIRED ABSENCE OF OTHER SPECIFIED PARTS OF DIGESTIVE TRACT: Chronic | ICD-10-CM

## 2021-11-09 DIAGNOSIS — Z98.89 OTHER SPECIFIED POSTPROCEDURAL STATES: Chronic | ICD-10-CM

## 2021-11-09 LAB
ALBUMIN SERPL ELPH-MCNC: 4.5 G/DL — SIGNIFICANT CHANGE UP (ref 3.3–5.2)
ALP SERPL-CCNC: 90 U/L — SIGNIFICANT CHANGE UP (ref 40–120)
ALT FLD-CCNC: 15 U/L — SIGNIFICANT CHANGE UP
ANION GAP SERPL CALC-SCNC: 15 MMOL/L — SIGNIFICANT CHANGE UP (ref 5–17)
APPEARANCE UR: CLEAR — SIGNIFICANT CHANGE UP
APTT BLD: 33.8 SEC — SIGNIFICANT CHANGE UP (ref 27.5–35.5)
AST SERPL-CCNC: 26 U/L — SIGNIFICANT CHANGE UP
BACTERIA # UR AUTO: ABNORMAL
BASOPHILS # BLD AUTO: 0.08 K/UL — SIGNIFICANT CHANGE UP (ref 0–0.2)
BASOPHILS NFR BLD AUTO: 0.8 % — SIGNIFICANT CHANGE UP (ref 0–2)
BILIRUB SERPL-MCNC: 0.4 MG/DL — SIGNIFICANT CHANGE UP (ref 0.4–2)
BILIRUB UR-MCNC: NEGATIVE — SIGNIFICANT CHANGE UP
BUN SERPL-MCNC: 29.9 MG/DL — HIGH (ref 8–20)
CALCIUM SERPL-MCNC: 9.4 MG/DL — SIGNIFICANT CHANGE UP (ref 8.6–10.2)
CHLORIDE SERPL-SCNC: 103 MMOL/L — SIGNIFICANT CHANGE UP (ref 98–107)
CO2 SERPL-SCNC: 21 MMOL/L — LOW (ref 22–29)
COLOR SPEC: YELLOW — SIGNIFICANT CHANGE UP
CREAT SERPL-MCNC: 2.09 MG/DL — HIGH (ref 0.5–1.3)
DIFF PNL FLD: NEGATIVE — SIGNIFICANT CHANGE UP
EOSINOPHIL # BLD AUTO: 0.97 K/UL — HIGH (ref 0–0.5)
EOSINOPHIL NFR BLD AUTO: 9.6 % — HIGH (ref 0–6)
EPI CELLS # UR: SIGNIFICANT CHANGE UP
GLUCOSE SERPL-MCNC: 121 MG/DL — HIGH (ref 70–99)
GLUCOSE UR QL: NEGATIVE MG/DL — SIGNIFICANT CHANGE UP
HCT VFR BLD CALC: 31 % — LOW (ref 39–50)
HGB BLD-MCNC: 10.3 G/DL — LOW (ref 13–17)
IMM GRANULOCYTES NFR BLD AUTO: 0.6 % — SIGNIFICANT CHANGE UP (ref 0–1.5)
INR BLD: 1.03 RATIO — SIGNIFICANT CHANGE UP (ref 0.88–1.16)
KETONES UR-MCNC: NEGATIVE — SIGNIFICANT CHANGE UP
LEUKOCYTE ESTERASE UR-ACNC: NEGATIVE — SIGNIFICANT CHANGE UP
LYMPHOCYTES # BLD AUTO: 1.74 K/UL — SIGNIFICANT CHANGE UP (ref 1–3.3)
LYMPHOCYTES # BLD AUTO: 17.2 % — SIGNIFICANT CHANGE UP (ref 13–44)
MCHC RBC-ENTMCNC: 32.4 PG — SIGNIFICANT CHANGE UP (ref 27–34)
MCHC RBC-ENTMCNC: 33.2 GM/DL — SIGNIFICANT CHANGE UP (ref 32–36)
MCV RBC AUTO: 97.5 FL — SIGNIFICANT CHANGE UP (ref 80–100)
MONOCYTES # BLD AUTO: 0.92 K/UL — HIGH (ref 0–0.9)
MONOCYTES NFR BLD AUTO: 9.1 % — SIGNIFICANT CHANGE UP (ref 2–14)
NEUTROPHILS # BLD AUTO: 6.36 K/UL — SIGNIFICANT CHANGE UP (ref 1.8–7.4)
NEUTROPHILS NFR BLD AUTO: 62.7 % — SIGNIFICANT CHANGE UP (ref 43–77)
NITRITE UR-MCNC: NEGATIVE — SIGNIFICANT CHANGE UP
PH UR: 5 — SIGNIFICANT CHANGE UP (ref 5–8)
PLATELET # BLD AUTO: 165 K/UL — SIGNIFICANT CHANGE UP (ref 150–400)
POTASSIUM SERPL-MCNC: 3.7 MMOL/L — SIGNIFICANT CHANGE UP (ref 3.5–5.3)
POTASSIUM SERPL-SCNC: 3.7 MMOL/L — SIGNIFICANT CHANGE UP (ref 3.5–5.3)
PROT SERPL-MCNC: 7.6 G/DL — SIGNIFICANT CHANGE UP (ref 6.6–8.7)
PROT UR-MCNC: 30 MG/DL
PROTHROM AB SERPL-ACNC: 11.9 SEC — SIGNIFICANT CHANGE UP (ref 10.6–13.6)
RAPID RVP RESULT: SIGNIFICANT CHANGE UP
RBC # BLD: 3.18 M/UL — LOW (ref 4.2–5.8)
RBC # FLD: 13.1 % — SIGNIFICANT CHANGE UP (ref 10.3–14.5)
RBC CASTS # UR COMP ASSIST: SIGNIFICANT CHANGE UP /HPF (ref 0–4)
SARS-COV-2 RNA SPEC QL NAA+PROBE: SIGNIFICANT CHANGE UP
SODIUM SERPL-SCNC: 139 MMOL/L — SIGNIFICANT CHANGE UP (ref 135–145)
SP GR SPEC: 1.01 — SIGNIFICANT CHANGE UP (ref 1.01–1.02)
UROBILINOGEN FLD QL: NEGATIVE MG/DL — SIGNIFICANT CHANGE UP
WBC # BLD: 10.13 K/UL — SIGNIFICANT CHANGE UP (ref 3.8–10.5)
WBC # FLD AUTO: 10.13 K/UL — SIGNIFICANT CHANGE UP (ref 3.8–10.5)
WBC UR QL: SIGNIFICANT CHANGE UP

## 2021-11-09 PROCEDURE — 71045 X-RAY EXAM CHEST 1 VIEW: CPT | Mod: 26

## 2021-11-09 PROCEDURE — 99285 EMERGENCY DEPT VISIT HI MDM: CPT | Mod: GC

## 2021-11-09 PROCEDURE — 70490 CT SOFT TISSUE NECK W/O DYE: CPT | Mod: 26,MC

## 2021-11-09 PROCEDURE — 99223 1ST HOSP IP/OBS HIGH 75: CPT

## 2021-11-09 PROCEDURE — 93010 ELECTROCARDIOGRAM REPORT: CPT

## 2021-11-09 RX ORDER — SODIUM CHLORIDE 9 MG/ML
1000 INJECTION, SOLUTION INTRAVENOUS ONCE
Refills: 0 | Status: COMPLETED | OUTPATIENT
Start: 2021-11-09 | End: 2021-11-09

## 2021-11-09 RX ORDER — DIAZEPAM 5 MG
5 TABLET ORAL ONCE
Refills: 0 | Status: DISCONTINUED | OUTPATIENT
Start: 2021-11-09 | End: 2021-11-09

## 2021-11-09 RX ORDER — AMPICILLIN TRIHYDRATE 250 MG
2 CAPSULE ORAL ONCE
Refills: 0 | Status: COMPLETED | OUTPATIENT
Start: 2021-11-09 | End: 2021-11-10

## 2021-11-09 RX ORDER — VANCOMYCIN HCL 1 G
1000 VIAL (EA) INTRAVENOUS ONCE
Refills: 0 | Status: COMPLETED | OUTPATIENT
Start: 2021-11-09 | End: 2021-11-09

## 2021-11-09 RX ORDER — MORPHINE SULFATE 50 MG/1
4 CAPSULE, EXTENDED RELEASE ORAL ONCE
Refills: 0 | Status: DISCONTINUED | OUTPATIENT
Start: 2021-11-09 | End: 2021-11-09

## 2021-11-09 RX ORDER — ACETAMINOPHEN 500 MG
1000 TABLET ORAL ONCE
Refills: 0 | Status: COMPLETED | OUTPATIENT
Start: 2021-11-09 | End: 2021-11-09

## 2021-11-09 RX ORDER — CEFTRIAXONE 500 MG/1
2000 INJECTION, POWDER, FOR SOLUTION INTRAMUSCULAR; INTRAVENOUS ONCE
Refills: 0 | Status: COMPLETED | OUTPATIENT
Start: 2021-11-09 | End: 2021-11-09

## 2021-11-09 RX ADMIN — Medication 250 MILLIGRAM(S): at 21:56

## 2021-11-09 RX ADMIN — Medication 5 MILLIGRAM(S): at 20:56

## 2021-11-09 RX ADMIN — Medication 400 MILLIGRAM(S): at 20:57

## 2021-11-09 NOTE — H&P ADULT - HISTORY OF PRESENT ILLNESS
89yoM hx Alzheimer’s dementia, prostate CA (diagnosed 2010, s/p treatment, in remission), essential tremors, CKD, HTN, AKOSUA on CPAP, hospitalized at [facility] in 10/2020 for sepsis due to infected renal stone presenting with neck pain.  Pt is able to provide reliable history.  Pt reports acute onset of left side neck pain that began after he woke up this morning and is aggravated by movement.  Pt reports prior episodes of similar type of neck pain associated with hand paresthesia and has been told that these symptoms are related to his ‘arthritis’ in the past.  Pt denies any headache, photophobia, neck stiffness, confusion, seizure activity, fevers, chills, chest pain, cough, abdominal pain, nausea, vomiting, diarrhea or any urinary symptoms besides his baseline urinary frequency which he attributes to prior prostate procedure.  On admission, pt febrile with Tmax 100.5 and hypertensive with SBP in 190s which resolved w/out any anti-hypertensives agents.  CT neck showed multilevel cervical spondylosis and 1.6 cm hypodense right-sided thyroid nodule.  In ED, pt given empiric abx for bacterial meningitis after he declined LP.

## 2021-11-09 NOTE — ED ADULT TRIAGE NOTE - CHIEF COMPLAINT QUOTE
" when my heart pumps pain goes up into my neck." pt denies hx of htn, pt placed C collar on self to help with neck pain. pt c/o knee pain at home,. denieS HA or double vision " when my heart pumps pain goes up into my neck." pt denies hx of htn, pt placed C collar on self to help with neck pain. pt c/o knee pain at home,. denieS HA or double vision or chest pain

## 2021-11-09 NOTE — ED PROVIDER NOTE - ATTENDING CONTRIBUTION TO CARE
I, Aggie Sharma, have personally seen and examined this patient. I have fully participated in the care of this patient. I have reviewed all pertinent clinical information, including history, physical exam, plan and the Resident's note and agree except as noted below.     Pt with 1 day of left lateral/posterior neck pain worse with movement also with generalized body aches. feels pulsating in nature in back of neck. no cp/sob. no focal weakness. no HA. no trauma     Gen: NAD, AOx3  Head: NCAT  HEENT: EOMI, oral mucosa moist, normal conjunctiva, +ttp left trapezius with tight muscle, limited ROM all directions worse with left rotation, mallampati IV unable to visualize posterior oropharynx, no submandibular ttp/swelling. tolerating secretions   Lung: CTAB, no respiratory distress  CV: rrr, no murmur, Normal perfusion  Abd: soft, NTND  MSK: No edema, no visible deformities  Neuro: No focal neurologic deficits, CN II-XII intact, 5/5 global strength, sensation intact  Skin: No rash   Psych: normal affect     patient with neck pain with limited ROM, no neuro deficits, no HA. noted to be febrile 100.5, will check labs, culture, possible soft tissue infection with location of pain. will get ct neck. labs. if ct unremarkable may need LP.

## 2021-11-09 NOTE — PATIENT PROFILE ADULT - NSTRANSFERBELONGINGSDISPO_GEN_A_NUR
Problem: Patient Education: Go to Patient Education Activity  Goal: Patient/Family Education  Outcome: Progressing Towards Goal  The patient attended the pre-operative joint replacement class. The content of the class, using a power-point presentation as well as visual demonstration was specific for patients undergoing total knee and total hip replacements. A pre-operative education booklet was provided to all patients. At the conclusion of class an opportunity was offered for any question or concerns the patient or family may have regarding their upcoming scheduled procedure. Patient and  attended class on 10/11/17. with patient

## 2021-11-09 NOTE — PATIENT PROFILE ADULT - NSASFALLATTEMPTOOB_GEN_A_NUR
----- Message from CASSIUS Josue CNP sent at 11/5/2020 10:37 AM EST -----  FIT test is negative. Repeat in one year.
Patient notified FIT test is negative and will repeat in one year
no

## 2021-11-09 NOTE — ED PROVIDER NOTE - PHYSICAL EXAMINATION
General: mild distress  Head: NC, AT  EENT: no scleral icterus, no neck swelling or erythema, R neck TTP  Cardiac: RRR, no apparent murmurs, no lower extremity edema  Respiratory: CTABL, no respiratory distress   Abdomen: soft, ND, NT, nonperitonitic  MSK/Vascular: soft compartments, warm extremities  Neuro: cranial nerves intact, strength and sensation intact throughout, normal finger to nose, normal gait  Psych: calm, cooperative General: mild distress  Head: NC, AT  EENT: no scleral icterus, no neck swelling or erythema, R neck TTP trapezius with tightness, limited ROM all directions more in left rotational, mallanpoti IV unable to visualize posterior oropharynx, no ttp submandibular region, no elevation of base of tongue, speech clear/tolerating secretions  Cardiac: RRR, no apparent murmurs, no lower extremity edema  Respiratory: CTABL, no respiratory distress   Abdomen: soft, ND, NT, nonperitonitic  MSK/Vascular: soft compartments, warm extremities  Neuro: cranial nerves intact, strength and sensation intact throughout, normal finger to nose, normal gait  Psych: calm, cooperative

## 2021-11-09 NOTE — H&P ADULT - NSICDXPASTMEDICALHX_GEN_ALL_CORE_FT
PAST MEDICAL HISTORY:  Alzheimers disease     Gastric ulcer     [place] (hard of hearing) has B/L hearing aid    HTN (hypertension)     Hypertension     Kidney stones     Prostate cancer 2013    Sleep apnea use  CPAP machine

## 2021-11-09 NOTE — H&P ADULT - NSHPPHYSICALEXAM_GEN_ALL_CORE
Vital Signs Last 24 Hrs  T(C): 37.2 (09 Nov 2021 23:30), Max: 38.1 (09 Nov 2021 15:07)  T(F): 98.9 (09 Nov 2021 23:30), Max: 100.5 (09 Nov 2021 15:07)  HR: 64 (09 Nov 2021 23:30) (64 - 75)  BP: 148/65 (09 Nov 2021 23:30) (148/65 - 199/78)  BP(mean): --  RR: 18 (09 Nov 2021 23:30) (18 - 20)  SpO2: 98% (09 Nov 2021 15:07) (98% - 98%)    GENERAL:  Well-appearing, not in acute distress  EYES:  Clear conjunctiva, extraocular movement intact  ENT: Moist mucous membranes  RESP:  Non-labored breathing pattern, lungs clear to ausculation in anterior fields  CV: Regular rate and rhythm, no murmurs appreciated, no lower extremity edema  GI: Soft, non-tender, non-distended  NEURO: Awake, alert, conversant, upper and lower extremity strength 5/5, light touch sensation grossly intact  PSYCH: Calm, cooperative  SKIN: No rash or lesions, warm and dry Vital Signs Last 24 Hrs  T(C): 37.2 (09 Nov 2021 23:30), Max: 38.1 (09 Nov 2021 15:07)  T(F): 98.9 (09 Nov 2021 23:30), Max: 100.5 (09 Nov 2021 15:07)  HR: 64 (09 Nov 2021 23:30) (64 - 75)  BP: 148/65 (09 Nov 2021 23:30) (148/65 - 199/78)  BP(mean): --  RR: 18 (09 Nov 2021 23:30) (18 - 20)  SpO2: 98% (09 Nov 2021 15:07) (98% - 98%)    GENERAL:  Well-appearing elderly male, non-toxic, wearing soft neck brace from home ,not in acute distress  EYES:  Clear conjunctiva, extraocular movement intact  ENT: Moist mucous membranes  RESP:  Non-labored breathing pattern, lungs clear to ausculation  CV: Regular rate and rhythm, no murmurs appreciated, no lower extremity edema  GI: Soft, non-tender, non-distended  NEURO: Awake, alert, conversant, upper and lower extremity strength 5/5, light touch sensation grossly intact  PSYCH: Calm, cooperative  SKIN: No rash or lesions, warm and dry

## 2021-11-09 NOTE — ED PROVIDER NOTE - NSICDXPASTMEDICALHX_GEN_ALL_CORE_FT
PAST MEDICAL HISTORY:  Alzheimers disease     Gastric ulcer     [place] (hard of hearing) has B/L hearing aid    HTN (hypertension)     Hypertension     Kidney stones     Prostate cancer 2013    Sleep apnea use  CPAP machine

## 2021-11-09 NOTE — ED PROVIDER NOTE - OBJECTIVE STATEMENT
89 y/o M with PMHx alzheimer's, prostate cancer in remission since 2010, kidney stones, CKD, HTN, tremor, who presents to the ED with left sided neck pain. Patient states that he woke up at 11 am this morning with severe L sided neck pain. He states that he feels like his blood vessels are "pulsating" in that area. He states that the pain is constant and rates it at a 10/10 in severity. He states that this has not happened to him before. Denies any headache, fevers, chills at home. Denies any history of strokes or TIAs. States that he has high BP but does not take any medications for it currently and does not remember what his BP usually is. Denies any recent illnesses and states that he is otherwise healthy. 88 y/o M with PMHx alzheimer's, prostate cancer in remission since 2010, kidney stones, CKD, HTN, tremor, who presents to the ED with left sided neck pain. Patient states that he woke up at 11 am this morning with severe L sided neck pain. He states that he feels like his blood vessels are "pulsating" in that area. He states that the pain is constant and rates it at a 10/10 in severity. worse with movement of the head to the left  He states that this has not happened to him before. Denies any headache, fevers, chills at home. complaint of generalized body aches also today. Denies any history of strokes or TIAs. States that he has high BP but does not take any medications for it currently and does not remember what his BP usually is. Denies any recent illnesses and states that he is otherwise healthy.

## 2021-11-09 NOTE — PATIENT PROFILE ADULT - VISION (WITH CORRECTIVE LENSES IF THE PATIENT USUALLY WEARS THEM):
Partially impaired: cannot see medication labels or newsprint, but can see obstacles in path, and the surrounding layout; can count fingers at arm's length
oriented to person, place, time and situation
oriented to person, place, time and situation

## 2021-11-09 NOTE — H&P ADULT - NSICDXPASTSURGICALHX_GEN_ALL_CORE_FT
PAST SURGICAL HISTORY:  H/O neck surgery     History of appendectomy     History of back surgery cervical surgery 1992    History of cholecystectomy

## 2021-11-09 NOTE — H&P ADULT - NSHPLABSRESULTS_GEN_ALL_CORE
10.3   10.13 )-----------( 165      ( 09 Nov 2021 17:07 )             31.0       11-09    139  |  103  |  29.9<H>  ----------------------------<  121<H>  3.7   |  21.0<L>  |  2.09<H>    Ca    9.4      09 Nov 2021 17:07    TPro  7.6  /  Alb  4.5  /  TBili  0.4  /  DBili  x   /  AST  26  /  ALT  15  /  AlkPhos  90  11-09

## 2021-11-09 NOTE — ED PROVIDER NOTE - PROGRESS NOTE DETAILS
patient still with pain, tylenol/valium ordered. discussed with patient and daughter findings with low suspicion for meningitis but with persistent severe neck pain with inability to range neck and fever need to perform LP. patient refusing. will give ceftriaxone, culture, TBA for ID consult -Zachary DO

## 2021-11-09 NOTE — H&P ADULT - ASSESSMENT
89yoM hx Alzheimer’s dementia, prostate CA (diagnosed 2010, s/p treatment, in remission), essential tremors, CKD, HTN, AKOSUA on CPAP, hospitalized at [facility] in 10/2020 for sepsis due to infected renal stone presenting with acute onset of left side neck pain that began after he woke up in the morning and was found to be febrile on admission     Neck pain   -CT neck showing multilevel cervical spondylosis and pt reporting improvement after valium administration, concern for musculoskeletal etiology  -Empiric coverage for bacterial meningitis given by ED after pt declined LP  -Low suspicion for bacterial meningitis as no meningeal signs, not toxic appearing, etc.  -Will not continue empiric abx at this time   -Tylenol PRN  -Will hold off an ordering PRN benzodiazepine or muscle relaxant given pt improvement and risk of delirium with advanced age     Fever  -Unclear etiology, no localizing symptom besides neck pain as above  -RVP, COVID and UA negative  -No leukocytosis noted  -CXR personally reviewed, no focal consolidation noted  -Received empiric abx in ED for above  -Monitoring off abx for now, re-consider if clinical status changes  -Urine and blood cultures pending   -Received 1L in ED, lactate 1.1, will hold off on maintenance IVF  -Check inflammatory markers  -ID consulted for further input    Thyroid nodule  -Incidental finding on CT neck, pt notified and advised to follow up with PMD    Hx CKD  -Cr appears improved from apparent baseline of 1.5 to 2    Hx HTN  -Severely hypertensive in ED during febrile episode, improved without intervention  -Does not appear to be on any BP meds, follow up with home med rec via pharmacy    Hx Alzheimer’s dementia  -Donepezil and meantime resumed    Hx AKOSUA  -Nocturnal CPAP ordered    Hx overactive bladder  -Oxybutynin resumed    Hx essential tremor  -Primidone resumed    Medication management  -Med rec partially completed by prescription writer, call [facility] pharmacy in AM    Prophylactic measure  -Heparin

## 2021-11-09 NOTE — ED PROVIDER NOTE - CLINICAL SUMMARY MEDICAL DECISION MAKING FREE TEXT BOX
88 y/o M with PMHx alzheimer's, prostate cancer in remission since 2010, kidney stones, CKD, HTN, tremor, who presents to the ED with left sided neck pain. CBC, CMP, lactate, cultures, CT neck soft tissue, morphine.

## 2021-11-10 ENCOUNTER — TRANSCRIPTION ENCOUNTER (OUTPATIENT)
Age: 86
End: 2021-11-10

## 2021-11-10 VITALS
RESPIRATION RATE: 18 BRPM | OXYGEN SATURATION: 95 % | TEMPERATURE: 99 F | SYSTOLIC BLOOD PRESSURE: 175 MMHG | DIASTOLIC BLOOD PRESSURE: 79 MMHG | HEART RATE: 75 BPM

## 2021-11-10 DIAGNOSIS — N32.81 OVERACTIVE BLADDER: ICD-10-CM

## 2021-11-10 DIAGNOSIS — G25.0 ESSENTIAL TREMOR: ICD-10-CM

## 2021-11-10 DIAGNOSIS — G47.33 OBSTRUCTIVE SLEEP APNEA (ADULT) (PEDIATRIC): ICD-10-CM

## 2021-11-10 DIAGNOSIS — N18.9 CHRONIC KIDNEY DISEASE, UNSPECIFIED: ICD-10-CM

## 2021-11-10 DIAGNOSIS — I10 ESSENTIAL (PRIMARY) HYPERTENSION: ICD-10-CM

## 2021-11-10 DIAGNOSIS — G30.9 ALZHEIMER'S DISEASE, UNSPECIFIED: ICD-10-CM

## 2021-11-10 LAB
ANION GAP SERPL CALC-SCNC: 14 MMOL/L — SIGNIFICANT CHANGE UP (ref 5–17)
BASOPHILS # BLD AUTO: 0.08 K/UL — SIGNIFICANT CHANGE UP (ref 0–0.2)
BASOPHILS NFR BLD AUTO: 0.8 % — SIGNIFICANT CHANGE UP (ref 0–2)
BUN SERPL-MCNC: 28.2 MG/DL — HIGH (ref 8–20)
CALCIUM SERPL-MCNC: 8.8 MG/DL — SIGNIFICANT CHANGE UP (ref 8.6–10.2)
CHLORIDE SERPL-SCNC: 105 MMOL/L — SIGNIFICANT CHANGE UP (ref 98–107)
CO2 SERPL-SCNC: 21 MMOL/L — LOW (ref 22–29)
COVID-19 NUCLEOCAPSID GAM AB INTERP: NEGATIVE — SIGNIFICANT CHANGE UP
COVID-19 NUCLEOCAPSID TOTAL GAM ANTIBODY RESULT: 0.08 INDEX — SIGNIFICANT CHANGE UP
COVID-19 SPIKE DOMAIN AB INTERP: POSITIVE
COVID-19 SPIKE DOMAIN ANTIBODY RESULT: >250 U/ML — HIGH
CREAT SERPL-MCNC: 1.94 MG/DL — HIGH (ref 0.5–1.3)
CRP SERPL-MCNC: 44 MG/L — HIGH
EOSINOPHIL # BLD AUTO: 1.03 K/UL — HIGH (ref 0–0.5)
EOSINOPHIL NFR BLD AUTO: 10.6 % — HIGH (ref 0–6)
ERYTHROCYTE [SEDIMENTATION RATE] IN BLOOD: 47 MM/HR — HIGH (ref 0–20)
GLUCOSE SERPL-MCNC: 119 MG/DL — HIGH (ref 70–99)
HCT VFR BLD CALC: 28.2 % — LOW (ref 39–50)
HGB BLD-MCNC: 9.4 G/DL — LOW (ref 13–17)
IMM GRANULOCYTES NFR BLD AUTO: 0.5 % — SIGNIFICANT CHANGE UP (ref 0–1.5)
LYMPHOCYTES # BLD AUTO: 2.11 K/UL — SIGNIFICANT CHANGE UP (ref 1–3.3)
LYMPHOCYTES # BLD AUTO: 21.6 % — SIGNIFICANT CHANGE UP (ref 13–44)
MCHC RBC-ENTMCNC: 32.1 PG — SIGNIFICANT CHANGE UP (ref 27–34)
MCHC RBC-ENTMCNC: 33.3 GM/DL — SIGNIFICANT CHANGE UP (ref 32–36)
MCV RBC AUTO: 96.2 FL — SIGNIFICANT CHANGE UP (ref 80–100)
MONOCYTES # BLD AUTO: 1.3 K/UL — HIGH (ref 0–0.9)
MONOCYTES NFR BLD AUTO: 13.3 % — SIGNIFICANT CHANGE UP (ref 2–14)
NEUTROPHILS # BLD AUTO: 5.19 K/UL — SIGNIFICANT CHANGE UP (ref 1.8–7.4)
NEUTROPHILS NFR BLD AUTO: 53.2 % — SIGNIFICANT CHANGE UP (ref 43–77)
PLATELET # BLD AUTO: 162 K/UL — SIGNIFICANT CHANGE UP (ref 150–400)
POTASSIUM SERPL-MCNC: 3.9 MMOL/L — SIGNIFICANT CHANGE UP (ref 3.5–5.3)
POTASSIUM SERPL-SCNC: 3.9 MMOL/L — SIGNIFICANT CHANGE UP (ref 3.5–5.3)
PROCALCITONIN SERPL-MCNC: 0.16 NG/ML — HIGH (ref 0.02–0.1)
RBC # BLD: 2.93 M/UL — LOW (ref 4.2–5.8)
RBC # FLD: 12.9 % — SIGNIFICANT CHANGE UP (ref 10.3–14.5)
SARS-COV-2 IGG+IGM SERPL QL IA: 0.08 INDEX — SIGNIFICANT CHANGE UP
SARS-COV-2 IGG+IGM SERPL QL IA: >250 U/ML — HIGH
SARS-COV-2 IGG+IGM SERPL QL IA: NEGATIVE — SIGNIFICANT CHANGE UP
SARS-COV-2 IGG+IGM SERPL QL IA: POSITIVE
SODIUM SERPL-SCNC: 140 MMOL/L — SIGNIFICANT CHANGE UP (ref 135–145)
WBC # BLD: 9.76 K/UL — SIGNIFICANT CHANGE UP (ref 3.8–10.5)
WBC # FLD AUTO: 9.76 K/UL — SIGNIFICANT CHANGE UP (ref 3.8–10.5)

## 2021-11-10 PROCEDURE — 96374 THER/PROPH/DIAG INJ IV PUSH: CPT

## 2021-11-10 PROCEDURE — 85652 RBC SED RATE AUTOMATED: CPT

## 2021-11-10 PROCEDURE — 86769 SARS-COV-2 COVID-19 ANTIBODY: CPT

## 2021-11-10 PROCEDURE — 71045 X-RAY EXAM CHEST 1 VIEW: CPT

## 2021-11-10 PROCEDURE — 85025 COMPLETE CBC W/AUTO DIFF WBC: CPT

## 2021-11-10 PROCEDURE — 99239 HOSP IP/OBS DSCHRG MGMT >30: CPT

## 2021-11-10 PROCEDURE — 70490 CT SOFT TISSUE NECK W/O DYE: CPT | Mod: MC

## 2021-11-10 PROCEDURE — 87040 BLOOD CULTURE FOR BACTERIA: CPT

## 2021-11-10 PROCEDURE — 0225U NFCT DS DNA&RNA 21 SARSCOV2: CPT

## 2021-11-10 PROCEDURE — 85730 THROMBOPLASTIN TIME PARTIAL: CPT

## 2021-11-10 PROCEDURE — 96375 TX/PRO/DX INJ NEW DRUG ADDON: CPT

## 2021-11-10 PROCEDURE — 83605 ASSAY OF LACTIC ACID: CPT

## 2021-11-10 PROCEDURE — 93005 ELECTROCARDIOGRAM TRACING: CPT

## 2021-11-10 PROCEDURE — 80053 COMPREHEN METABOLIC PANEL: CPT

## 2021-11-10 PROCEDURE — 81001 URINALYSIS AUTO W/SCOPE: CPT

## 2021-11-10 PROCEDURE — 80048 BASIC METABOLIC PNL TOTAL CA: CPT

## 2021-11-10 PROCEDURE — 36415 COLL VENOUS BLD VENIPUNCTURE: CPT

## 2021-11-10 PROCEDURE — 85610 PROTHROMBIN TIME: CPT

## 2021-11-10 PROCEDURE — 87086 URINE CULTURE/COLONY COUNT: CPT

## 2021-11-10 PROCEDURE — 86140 C-REACTIVE PROTEIN: CPT

## 2021-11-10 PROCEDURE — 99285 EMERGENCY DEPT VISIT HI MDM: CPT | Mod: 25

## 2021-11-10 PROCEDURE — 99223 1ST HOSP IP/OBS HIGH 75: CPT

## 2021-11-10 PROCEDURE — 84145 PROCALCITONIN (PCT): CPT

## 2021-11-10 RX ORDER — OXYBUTYNIN CHLORIDE 5 MG
5 TABLET ORAL DAILY
Refills: 0 | Status: DISCONTINUED | OUTPATIENT
Start: 2021-11-10 | End: 2021-11-10

## 2021-11-10 RX ORDER — ZOLPIDEM TARTRATE 10 MG/1
1 TABLET ORAL
Qty: 0 | Refills: 0 | DISCHARGE

## 2021-11-10 RX ORDER — GLUCOSAMINE/CHONDROITIN/C/MANG 500-400 MG
3 CAPSULE ORAL
Qty: 0 | Refills: 0 | DISCHARGE

## 2021-11-10 RX ORDER — HEPARIN SODIUM 5000 [USP'U]/ML
5000 INJECTION INTRAVENOUS; SUBCUTANEOUS EVERY 8 HOURS
Refills: 0 | Status: DISCONTINUED | OUTPATIENT
Start: 2021-11-10 | End: 2021-11-10

## 2021-11-10 RX ORDER — OMEGA-3 ACID ETHYL ESTERS 1 G
1000 CAPSULE ORAL
Qty: 0 | Refills: 0 | DISCHARGE

## 2021-11-10 RX ORDER — MEMANTINE HYDROCHLORIDE 10 MG/1
10 TABLET ORAL DAILY
Refills: 0 | Status: DISCONTINUED | OUTPATIENT
Start: 2021-11-10 | End: 2021-11-10

## 2021-11-10 RX ORDER — OXYBUTYNIN CHLORIDE 5 MG
1 TABLET ORAL
Qty: 0 | Refills: 0 | DISCHARGE

## 2021-11-10 RX ORDER — PRIMIDONE 250 MG/1
50 TABLET ORAL AT BEDTIME
Refills: 0 | Status: DISCONTINUED | OUTPATIENT
Start: 2021-11-10 | End: 2021-11-10

## 2021-11-10 RX ORDER — ACETAMINOPHEN 500 MG
650 TABLET ORAL EVERY 6 HOURS
Refills: 0 | Status: DISCONTINUED | OUTPATIENT
Start: 2021-11-10 | End: 2021-11-10

## 2021-11-10 RX ORDER — DONEPEZIL HYDROCHLORIDE 10 MG/1
10 TABLET, FILM COATED ORAL AT BEDTIME
Refills: 0 | Status: DISCONTINUED | OUTPATIENT
Start: 2021-11-10 | End: 2021-11-10

## 2021-11-10 RX ORDER — CYCLOBENZAPRINE HYDROCHLORIDE 10 MG/1
1 TABLET, FILM COATED ORAL
Qty: 10 | Refills: 0
Start: 2021-11-10 | End: 2021-11-14

## 2021-11-10 RX ADMIN — HEPARIN SODIUM 5000 UNIT(S): 5000 INJECTION INTRAVENOUS; SUBCUTANEOUS at 13:24

## 2021-11-10 RX ADMIN — HEPARIN SODIUM 5000 UNIT(S): 5000 INJECTION INTRAVENOUS; SUBCUTANEOUS at 05:48

## 2021-11-10 RX ADMIN — CEFTRIAXONE 100 MILLIGRAM(S): 500 INJECTION, POWDER, FOR SOLUTION INTRAMUSCULAR; INTRAVENOUS at 00:35

## 2021-11-10 RX ADMIN — Medication 5 MILLIGRAM(S): at 13:25

## 2021-11-10 RX ADMIN — Medication 200.02 MILLIGRAM(S): at 01:42

## 2021-11-10 RX ADMIN — SODIUM CHLORIDE 1000 MILLILITER(S): 9 INJECTION, SOLUTION INTRAVENOUS at 02:22

## 2021-11-10 RX ADMIN — Medication 650 MILLIGRAM(S): at 13:24

## 2021-11-10 RX ADMIN — Medication 650 MILLIGRAM(S): at 05:49

## 2021-11-10 RX ADMIN — MEMANTINE HYDROCHLORIDE 10 MILLIGRAM(S): 10 TABLET ORAL at 13:25

## 2021-11-10 NOTE — CONSULT NOTE ADULT - ASSESSMENT
89y  Male with h/o Alzheimer’s dementia, prostate CA (diagnosed 2010, s/p treatment, in remission), essential tremors, CKD, HTN, AKOSUA on CPAP, hospitalized at [facility] in 10/2020 for sepsis due to infected renal stone presenting with neck pain.  He reports acute onset of left side neck pain that began after he woke up in the morning and is aggravated by movement. Had similar type of neck pain associated with hand paresthesia and has been told that these symptoms are related to his ‘arthritis’ in the past. Denies any headache, photophobia, neck stiffness, confusion, seizure activity, fevers, chills, chest pain, cough, abdominal pain, nausea, vomiting, diarrhea or any urinary symptoms besides his baseline urinary frequency. On admission, he was febrile with Tmax 100.5F and hypertensive with SBP in 190s which resolved w/out any anti-hypertensives agents.  CT neck showed multilevel cervical spondylosis and 1.6 cm hypodense right-sided thyroid nodule. Patient admitted to r/o Meningitis, LP refused by the patient.      Fever  Neck pain  unlikely meningitis      - Blood cultures pending  - No signs of meningitis since patient does not have a headache, neck pain is worse on the left side and not general, no Kernig's or Brudzinski sign present.    - RVP/COVID 19 PCR negative   - CT Neck reporting Spondylosis   - UA negative for UTI   - Procalcitonin level 0.16 which is not consistent with infection   - Has been off antibiotics since last night   - Follow up cultures  - Trend Fever  - Trend WBC      Thank you for allowing me to participate in the care of your patient.   Will Follow    d/w Dr Vides

## 2021-11-10 NOTE — PROGRESS NOTE ADULT - SUBJECTIVE AND OBJECTIVE BOX
Patient is a 89y old  Male who presents with a chief complaint of Neck pain, isolated fever (10 Nov 2021 09:53)    Patient seen and examined at bedside.     ALLERGIES:  No Known Allergies    MEDICATIONS  (STANDING):  donepezil 10 milliGRAM(s) Oral at bedtime  heparin   Injectable 5000 Unit(s) SubCutaneous every 8 hours  memantine 10 milliGRAM(s) Oral daily  oxybutynin XL 5 milliGRAM(s) Oral daily  primidone 50 milliGRAM(s) Oral at bedtime    MEDICATIONS  (PRN):  acetaminophen     Tablet .. 650 milliGRAM(s) Oral every 6 hours PRN Temp greater or equal to 38C (100.4F), Mild Pain (1 - 3), Moderate Pain (4 - 6)    Vital Signs Last 24 Hrs  T(F): 98.2 (10 Nov 2021 10:24), Max: 100.5 (2021 15:07)  HR: 66 (10 Nov 2021 10:24) (64 - 75)  BP: 137/66 (10 Nov 2021 10:24) (137/66 - 199/78)  RR: 18 (10 Nov 2021 10:24) (18 - 20)  SpO2: 95% (10 Nov 2021 10:24) (93% - 98%)  I&O's Summary    10 Nov 2021 07:01  -  10 Nov 2021 11:43  --------------------------------------------------------  IN: 0 mL / OUT: 200 mL / NET: -200 mL      PHYSICAL EXAM:  General: NAD, A/O x 3  ENT: MMM, no thrush  Neck: Supple, No JVD  Lungs: Clear to auscultation bilaterally, good air entry, non-labored breathing  Cardio: +s1/s2  Abdomen: Soft, Nontender, Nondistended; Bowel sounds present  Extremities: No calf tenderness, No pitting edema  MSK tenderness on left SCM    LABS:                        9.4    9.76  )-----------( 162      ( 10 Nov 2021 06:09 )             28.2     11-10    140  |  105  |  28.2  ----------------------------<  119  3.9   |  21.0  |  1.94    Ca    8.8      10 Nov 2021 06:09    TPro  7.6  /  Alb  4.5  /  TBili  0.4  /  DBili  x   /  AST  26  /  ALT  15  /  AlkPhos  90      eGFR if Non African American: 30 mL/min/1.73M2 (11-10-21 @ 06:09)  eGFR if African American: 35 mL/min/1.73M2 (11-10-21 @ 06:09)    PT/INR - ( 2021 17:07 )   PT: 11.9 sec;   INR: 1.03 ratio    PTT - ( 2021 17:07 )  PTT:33.8 sec    Procalcitonin, Serum: 0.16 ng/mL (11-10-21 @ 06:09)    17:08 - VBG - pH:       | pCO2:       | pO2:       | Lactate: 1.10     Urinalysis Basic - ( 2021 22:55 )  Color: Yellow / Appearance: Clear / S.015 / pH: x  Gluc: x / Ketone: Negative  / Bili: Negative / Urobili: Negative mg/dL   Blood: x / Protein: 30 mg/dL / Nitrite: Negative   Leuk Esterase: Negative / RBC: 0-2 /HPF / WBC 0-2   Sq Epi: x / Non Sq Epi: Occasional / Bacteria: Occasional    RADIOLOGY & ADDITIONAL TESTS:  < from: CT Neck Soft Tissue No Cont (21 @ 19:48) >  IMPRESSION: Limited study secondary to exclusion of IV contrast also secondary to motion artifact generated by patient swallowing.  Multilevel cervical spondylosis.  1.6 cm hypodense right-sided thyroid nodule.  Nonspecific periapical radiolucency surrounding the roots of the right first maxillary molar tooth. Correlate with dental examination.  < end of copied text >    < from: Xray Chest 1 View- PORTABLE-Urgent (Xray Chest 1 View- PORTABLE-Urgent .) (21 @ 18:13) >  IMPRESSION: No acute finding at this time.  < end of copied text >    Care Discussed with Consultants/Other Providers:   ID Patient is a 89y old  Male who presents with a chief complaint of Neck pain, isolated fever (10 Nov 2021 09:53)    Patient seen and examined at bedside.     ALLERGIES:  No Known Allergies    MEDICATIONS  (STANDING):  donepezil 10 milliGRAM(s) Oral at bedtime  heparin   Injectable 5000 Unit(s) SubCutaneous every 8 hours  memantine 10 milliGRAM(s) Oral daily  oxybutynin XL 5 milliGRAM(s) Oral daily  primidone 50 milliGRAM(s) Oral at bedtime    MEDICATIONS  (PRN):  acetaminophen     Tablet .. 650 milliGRAM(s) Oral every 6 hours PRN Temp greater or equal to 38C (100.4F), Mild Pain (1 - 3), Moderate Pain (4 - 6)    Vital Signs Last 24 Hrs  T(F): 98.2 (10 Nov 2021 10:24), Max: 100.5 (2021 15:07)  HR: 66 (10 Nov 2021 10:24) (64 - 75)  BP: 137/66 (10 Nov 2021 10:24) (137/66 - 199/78)  RR: 18 (10 Nov 2021 10:24) (18 - 20)  SpO2: 95% (10 Nov 2021 10:24) (93% - 98%)  I&O's Summary    10 Nov 2021 07:01  -  10 Nov 2021 11:43  --------------------------------------------------------  IN: 0 mL / OUT: 200 mL / NET: -200 mL      PHYSICAL EXAM:  General: NAD, A/O x 3  ENT: MMM, no thrush  Neck: Supple, No JVD  Lungs: Clear to auscultation bilaterally, good air entry, non-labored breathing  Cardio: +s1/s2  Abdomen: Soft, Nontender, Nondistended; Bowel sounds present  Extremities: No calf tenderness, No pitting edema  MSK: tenderness on left SCM    LABS:                        9.4    9.76  )-----------( 162      ( 10 Nov 2021 06:09 )             28.2     11-10    140  |  105  |  28.2  ----------------------------<  119  3.9   |  21.0  |  1.94    Ca    8.8      10 Nov 2021 06:09    TPro  7.6  /  Alb  4.5  /  TBili  0.4  /  DBili  x   /  AST  26  /  ALT  15  /  AlkPhos  90      eGFR if Non African American: 30 mL/min/1.73M2 (11-10-21 @ 06:09)  eGFR if African American: 35 mL/min/1.73M2 (11-10-21 @ 06:09)    PT/INR - ( 2021 17:07 )   PT: 11.9 sec;   INR: 1.03 ratio    PTT - ( 2021 17:07 )  PTT:33.8 sec    Procalcitonin, Serum: 0.16 ng/mL (11-10-21 @ 06:09)    17:08 - VBG - pH:       | pCO2:       | pO2:       | Lactate: 1.10     Urinalysis Basic - ( 2021 22:55 )  Color: Yellow / Appearance: Clear / S.015 / pH: x  Gluc: x / Ketone: Negative  / Bili: Negative / Urobili: Negative mg/dL   Blood: x / Protein: 30 mg/dL / Nitrite: Negative   Leuk Esterase: Negative / RBC: 0-2 /HPF / WBC 0-2   Sq Epi: x / Non Sq Epi: Occasional / Bacteria: Occasional    RADIOLOGY & ADDITIONAL TESTS:  < from: CT Neck Soft Tissue No Cont (21 @ 19:48) >  IMPRESSION: Limited study secondary to exclusion of IV contrast also secondary to motion artifact generated by patient swallowing.  Multilevel cervical spondylosis.  1.6 cm hypodense right-sided thyroid nodule.  Nonspecific periapical radiolucency surrounding the roots of the right first maxillary molar tooth. Correlate with dental examination.  < end of copied text >    < from: Xray Chest 1 View- PORTABLE-Urgent (Xray Chest 1 View- PORTABLE-Urgent .) (21 @ 18:13) >  IMPRESSION: No acute finding at this time.  < end of copied text >    Care Discussed with Consultants/Other Providers:   ID

## 2021-11-10 NOTE — DISCHARGE NOTE PROVIDER - NSDCCPCAREPLAN_GEN_ALL_CORE_FT
PRINCIPAL DISCHARGE DIAGNOSIS  Diagnosis: Acute neck pain  Assessment and Plan of Treatment: your neck pain is likely musculoskeletal in nature. please take your prescirbed flexiril as needed. please follow up with your primary care doctor upon discharge.      SECONDARY DISCHARGE DIAGNOSES  Diagnosis: Fever of unknown origin  Assessment and Plan of Treatment:     Diagnosis: Thyroid nodule incidentally noted on imaging study  Assessment and Plan of Treatment: please follow up CAT scan findings with primary care doctor.     PRINCIPAL DISCHARGE DIAGNOSIS  Diagnosis: Acute neck pain  Assessment and Plan of Treatment: your neck pain is likely musculoskeletal in nature. please take your prescirbed flexiril as needed. please follow up with your primary care doctor upon discharge.      SECONDARY DISCHARGE DIAGNOSES  Diagnosis: Thyroid nodule incidentally noted on imaging study  Assessment and Plan of Treatment: please follow up CAT scan findings with primary care doctor.

## 2021-11-10 NOTE — DISCHARGE NOTE PROVIDER - NSDCMRMEDTOKEN_GEN_ALL_CORE_FT
Aricept 10 mg oral tablet: 1 tab(s) orally once a day (at bedtime)  Co Q-10 100 mg oral capsule:  orally 2 times a day  ergocalciferol: 350 milligram(s) orally once a day  Fish Oil: 1000 milligram(s) orally 2 times a day  glucosamine: 1 cap(s) orally 2 times a day  Glucosamine Chondroitin oral capsule: 3 cap(s) orally once a day  Multiple Vitamins oral tablet: 1 tab(s) orally once a day  Namenda 10 mg oral tablet:  orally once a day  oxybutynin 5 mg oral tablet: 1 tab(s) orally once a day  primidone 50 mg oral tablet: orally once a day (at bedtime)  zolpidem 5 mg oral tablet: 1 tab(s) orally once a day (at bedtime)   Aricept 10 mg oral tablet: 1 tab(s) orally once a day (at bedtime)  cyclobenzaprine 5 mg oral tablet: 1 tab(s) orally every 12 hours please take with food and do not operate machinary or drive while on medication  ergocalciferol: 350 milligram(s) orally once a day  Multiple Vitamins oral tablet: 1 tab(s) orally once a day  Namenda 10 mg oral tablet:  orally once a day  naproxen 500 mg oral tablet: 1 tab(s) orally every 12 hours ; take medication with food  oxybutynin 5 mg oral tablet: 1 tab(s) orally once a day  primidone 50 mg oral tablet: orally once a day (at bedtime)

## 2021-11-10 NOTE — CONSULT NOTE ADULT - SUBJECTIVE AND OBJECTIVE BOX
[facility]  INFECTIOUS DISEASES AND INTERNAL MEDICINE at [place]  =======================================================  Wil Rodriguez MD  Diplomates American Board of Internal Medicine and Infectious Diseases  Tel: [phone]      Fax: [phone]  =======================================================      [id number]  ADELA COLÓN    CC: Patient is a 89y old  Male who presents with a chief complaint of Neck pain, isolated fever (10 Nov 2021 11:42)      89y  Male with h/o Alzheimer’s dementia, prostate CA (diagnosed , s/p treatment, in remission), essential tremors, CKD, HTN, AKOSUA on CPAP, hospitalized at [facility] in 10/2020 for sepsis due to infected renal stone presenting with neck pain.  He reports acute onset of left side neck pain that began after he woke up in the morning and is aggravated by movement. Had similar type of neck pain associated with hand paresthesia and has been told that these symptoms are related to his ‘arthritis’ in the past. Denies any headache, photophobia, neck stiffness, confusion, seizure activity, fevers, chills, chest pain, cough, abdominal pain, nausea, vomiting, diarrhea or any urinary symptoms besides his baseline urinary frequency. On admission, he was febrile with Tmax 100.5F and hypertensive with SBP in 190s which resolved w/out any anti-hypertensives agents.  CT neck showed multilevel cervical spondylosis and 1.6 cm hypodense right-sided thyroid nodule. Patient admitted to r/o Meningitis, LP refused by the patient. ID input requested.       Past Medical & Surgical Hx:  Alzheimers disease  Hypertension  Sleep apnea use  CPAP machine  [place] (hard of hearing) has B/L hearing aid  Prostate cancer   HTN (hypertension)  Kidney stones  Gastric ulcer  History of appendectomy  History of back surgery, cervical surgery   H/O neck surgery  History of cholecystectomy      Social Hx:  Never smoker       FAMILY HISTORY:  Patient does not recall family history of medical problems       Allergies  No Known Allergies       REVIEW OF SYSTEMS:  CONSTITUTIONAL:  No Fever or chills  HEENT:  No diplopia or blurred vision.  No earache, sore throat or runny nose.  CARDIOVASCULAR:  No pressure, squeezing, strangling, tightness, heaviness or aching about the chest, neck, axilla or epigastrium.  RESPIRATORY:  No cough, shortness of breath  GASTROINTESTINAL:  No nausea, vomiting or diarrhea.  GENITOURINARY:  No dysuria, frequency or urgency.   MUSCULOSKELETAL:  no joint aches, no muscle pain  SKIN:  No change in skin, hair or nails.  NEUROLOGIC:  No Headaches, seizures   PSYCHIATRIC:  No disorder of thought or mood.  ENDOCRINE:  No heat or cold intolerance  HEMATOLOGICAL:  No easy bruising or bleeding.       Physical Exam:  GEN: NAD, pleasant  HEENT: normocephalic and atraumatic. EOMI. PERRL.  Anicteric  NECK: Supple.   LUNGS: Clear to auscultation.  HEART: Regular rate and rhythm   ABDOMEN: Soft, nontender, and nondistended.  Positive bowel sounds.    : No CVA tenderness  EXTREMITIES: Without any edema.  MSK: No joint swelling  NEUROLOGIC:  No Focal Deficits. No Brudzinski or Kernig's   PSYCHIATRIC: Appropriate affect .  SKIN: No Rash    Height (cm): 167.6 ( @ 15:07)  Weight (kg): 74.8 ( @ 15:07)  BMI (kg/m2): 26.6 ( @ 15:07)  BSA (m2): 1.84 ( @ 15:07)      Vitals:  T(F): 98.2 (10 Nov 2021 10:24), Max: 100.5 (2021 15:07)  HR: 66 (10 Nov 2021 10:24)  BP: 137/66 (10 Nov 2021 10:24)  RR: 18 (10 Nov 2021 10:24)  SpO2: 95% (10 Nov 2021 10:24) (93% - 98%)  temp max in last 48H T(F): , Max: 100.5 (21 @ 15:07)      Current Antibiotics:    Other medications:  donepezil 10 milliGRAM(s) Oral at bedtime  heparin   Injectable 5000 Unit(s) SubCutaneous every 8 hours  memantine 10 milliGRAM(s) Oral daily  oxybutynin XL 5 milliGRAM(s) Oral daily  primidone 50 milliGRAM(s) Oral at bedtime                            9.4    9.76  )-----------( 162      ( 10 Nov 2021 06:09 )             28.2     11-10    140  |  105  |  28.2<H>  ----------------------------<  119<H>  3.9   |  21.0<L>  |  1.94<H>    Ca    8.8      10 Nov 2021 06:09    TPro  7.6  /  Alb  4.5  /  TBili  0.4  /  DBili  x   /  AST  26  /  ALT  15  /  AlkPhos  90        WBC Count: 9.76 K/uL (11-10-21 @ 06:09)  WBC Count: 10.13 K/uL (21 @ 17:07)    Creatinine, Serum: 1.94 mg/dL (11-10-21 @ 06:09)  Creatinine, Serum: 2.09 mg/dL (21 @ 17:07)    C-Reactive Protein, Serum: 44 mg/L (11-10-21 @ 06:09)    Sedimentation Rate, Erythrocyte: 47 mm/hr (11-10-21 @ 06:09)    Procalcitonin, Serum: 0.16 ng/mL (11-10-21 @ 06:09)     SARS-CoV-2: NotDetec (21 @ 17:06)  Rapid RVP Result: NotDetec (21 @ 17:06)    Urinalysis Basic - ( 2021 22:55 )    Color: Yellow / Appearance: Clear / S.015 / pH: x  Gluc: x / Ketone: Negative  / Bili: Negative / Urobili: Negative mg/dL   Blood: x / Protein: 30 mg/dL / Nitrite: Negative   Leuk Esterase: Negative / RBC: 0-2 /HPF / WBC 0-2   Sq Epi: x / Non Sq Epi: Occasional / Bacteria: Occasional        < from: CT Neck Soft Tissue No Cont (21 @ 19:48) >  EXAM:  CT NECK SOFT TISSUE                          PROCEDURE DATE:  2021      INTERPRETATION:  .    CLINICAL INFORMATION: Neck pain.    TECHNIQUE: Axial sections were obtained from the skull base to the sternum without the administration of intravenous contrast. Sagittal and Coronal reformations were obtained from the source data.    COMPARISON: None available.    FINDINGS: Evaluation is limited secondary to exclusion of IV contrast and also secondary to motion artifact generated bypatient swallowing.    No gross nodularity is seen throughout the aerodigestive tract.    No cervical lymphadenopathy is seen.    The bilateral parotid and submandibular glands appear unremarkable on this noncontrast scan.    The thyroid gland appears heterogeneous and contains a 1.6 cm hypodense right-sided nodule.    There is a bovine configuration to the aortic arch. Evaluation of the neck vessels is limited secondary to exclusion of IV contrast. Arterial vascular calcifications are seen.    The imaged intracranial and orbital structures appear unremarkable apart from a left-sided cataract removal.    A polyp versus retention cyst is seen in the left maxillary sinus. Otherwise, the paranasal sinuses and mastoid air cells are clear.    The maxilla and mandible appear intact. Nonspecific periapical radiolucency surrounds the roots of the right first maxillary molar tooth.    Multilevel cervical spondylosis is seen. The patient is status post C4-C6 laminectomy.    The imaged portions of the lungs are clear.    IMPRESSION:   Limited study secondary to exclusion of IV contrast also secondary to motion artifact generated by patient swallowing.    Multilevel cervical spondylosis.    1.6 cm hypodense right-sided thyroid nodule.    Nonspecific periapical radiolucency surrounding the roots of the right first maxillary molar tooth. Correlate with dental examination.  --- End of Report ---  < end of copied text >

## 2021-11-10 NOTE — DISCHARGE NOTE NURSING/CASE MANAGEMENT/SOCIAL WORK - PATIENT PORTAL LINK FT
You can access the FollowMyHealth Patient Portal offered by [facility] by registering at the following website: [email or web address]. By joining [facility]’s FollowMyHealth portal, you will also be able to view your health information using other applications (apps) compatible with our system.

## 2021-11-10 NOTE — DISCHARGE NOTE PROVIDER - HOSPITAL COURSE
89 year-old male with history of Alzheimer’s dementia, prostate CA, essential tremors, CKD, HTN, AKOSUA on CPAP Presented to [facility] ED with acute L-sided neck pain. of note patient has a similar episode of neck pain associated with hand paresthesia in the past. In the ED, patient was noted to be febrile ( Tmax 100.5) and hypertensive (SBP in 190s) which resolved without intervention. RVP, COVID and UA negative. no leukocytosis. CT neck showed multilevel cervical spondylosis and 1.6 cm hypodense right-sided thyroid nodule. He was given empiric ampicillin, vancomycin and Rocephin for possible bacterial meningitis. Patient refused LP. Urine and blood cultures were sent in ED. Patient admitted for further work up of neck pain. low suspicion for bacterial meningitis given no meningeal signs, non toxic appearance. antibiotics were discontinued. Neck pain likely musculoskeletal in nature.     incidental finding of thyroid nodule noted on CT neck. discussed with patient and advised to follow up with his primary care doctor for further work-up.     Length of discharge:  34 min           89 year-old male with history of Alzheimer’s dementia, prostate CA, essential tremors, CKD, HTN, AKOSUA on CPAP Presented to [facility] ED with acute L-sided neck pain. of note patient has a similar episode of neck pain associated with hand paresthesia in the past. CT neck showed multilevel cervical spondylosis and 1.6 cm hypodense right-sided thyroid nodule. Patient refused LP however no headache vision changes, no sensitivity to light, no kernig or brudinski sign.     incidental finding of thyroid nodule noted on CT neck. discussed with patient and advised to follow up with his primary care doctor for further work-up.     Time spent on patients discharge 32 minutes

## 2021-11-11 LAB
CULTURE RESULTS: SIGNIFICANT CHANGE UP
SPECIMEN SOURCE: SIGNIFICANT CHANGE UP

## 2021-12-10 ENCOUNTER — APPOINTMENT (OUTPATIENT)
Dept: RHEUMATOLOGY | Facility: CLINIC | Age: 86
End: 2021-12-10
Payer: MEDICARE

## 2021-12-10 VITALS
OXYGEN SATURATION: 98 % | DIASTOLIC BLOOD PRESSURE: 86 MMHG | HEART RATE: 55 BPM | TEMPERATURE: 97.9 F | BODY MASS INDEX: 28.28 KG/M2 | HEIGHT: 66 IN | WEIGHT: 176 LBS | SYSTOLIC BLOOD PRESSURE: 158 MMHG | RESPIRATION RATE: 17 BRPM

## 2021-12-10 PROCEDURE — 99203 OFFICE O/P NEW LOW 30 MIN: CPT

## 2021-12-15 NOTE — PHYSICAL EXAM
[General Appearance - Alert] : alert [General Appearance - In No Acute Distress] : in no acute distress [General Appearance - Well Nourished] : well nourished [General Appearance - Well Developed] : well developed [Sclera] : the sclera and conjunctiva were normal [Outer Ear] : the ears and nose were normal in appearance [Neck Appearance] : the appearance of the neck was normal [Heart Sounds] : normal S1 and S2 [] : no rash [No Focal Deficits] : no focal deficits [Oriented To Time, Place, And Person] : oriented to person, place, and time [Musculoskeletal - Swelling] : no joint swelling seen

## 2021-12-15 NOTE — HISTORY OF PRESENT ILLNESS
[FreeTextEntry1] : 89 year old male with PMH as listed below presents today for an initial evaluation\par \par Reports 3 week hx of sudden onset fatigue, generalized diffuse body pain, achiness in his joints, stiffness in his joints. \par Reports symptoms worse to BL hips and BL shoulders. \par Symptoms worse in AM. Better with activity. Currently taking Tylenol prn for pain with no significant improvement symptoms. \par \par Denies prior similar episodes. Denies swelling to the joints. Denies falls/trauma. \par Denies recent illness, tick bites, travel. \par \par denies fever, chills, oral ulcers, denies chest pain, chest palpitations, abdominal pain, denies rashes, photosensitivity, denies blood clots,  raynauds

## 2021-12-15 NOTE — ASSESSMENT
[FreeTextEntry1] : Symptoms concerning for PMR\par \par - labs as below\par - trail with low dose prednisone (reviewed risk and benefits of medications)\par \par Discussed treatment plan with the patient and his wife. The patient was given the opportunity to ask questions and all questions were answered to their satisfaction.\par

## 2021-12-27 ENCOUNTER — APPOINTMENT (OUTPATIENT)
Dept: RHEUMATOLOGY | Facility: CLINIC | Age: 86
End: 2021-12-27

## 2021-12-27 ENCOUNTER — APPOINTMENT (OUTPATIENT)
Dept: RHEUMATOLOGY | Facility: CLINIC | Age: 86
End: 2021-12-27
Payer: MEDICARE

## 2021-12-27 PROCEDURE — 99214 OFFICE O/P EST MOD 30 MIN: CPT | Mod: 95

## 2021-12-27 NOTE — HISTORY OF PRESENT ILLNESS
[Home] : at home, [unfilled] , at the time of the visit. [Medical Office: ([facility])___] : at the medical office located in  [Verbal consent obtained from patient] : the patient, [unfilled] [Family Member] : family member [FreeTextEntry1] : Pt evaluated today for a f.u visit. Currently asymptomatic. \par Reports significant improvement in symptoms with prednisone.\par Currently taking prednisone 10mg daily. Tolerating medication well. Denies side effects. \par No current complaints.

## 2021-12-27 NOTE — ASSESSMENT
[FreeTextEntry1] : Symptoms concerning for PMR\par \par - labs as below\par - taper prednisone to 7.5mg daily trail (reviewed risk and benefits of medications)\par \par Discussed treatment plan with the patient and his daughter. The patient was given the opportunity to ask questions and all questions were answered to their satisfaction.\par

## 2022-01-03 ENCOUNTER — APPOINTMENT (OUTPATIENT)
Dept: RHEUMATOLOGY | Facility: CLINIC | Age: 87
End: 2022-01-03

## 2022-02-02 NOTE — ASU PREOP CHECKLIST - AS TEMP SITE
Called and spoke to patient. Did inform patient of provider's recommendations below. Patient understood and had no other questions at this time.    temporal

## 2022-03-09 ENCOUNTER — APPOINTMENT (OUTPATIENT)
Dept: ENDOCRINOLOGY | Facility: CLINIC | Age: 87
End: 2022-03-09

## 2022-04-04 ENCOUNTER — APPOINTMENT (OUTPATIENT)
Dept: RHEUMATOLOGY | Facility: CLINIC | Age: 87
End: 2022-04-04
Payer: MEDICARE

## 2022-04-04 ENCOUNTER — LABORATORY RESULT (OUTPATIENT)
Age: 87
End: 2022-04-04

## 2022-04-04 VITALS
HEIGHT: 66 IN | SYSTOLIC BLOOD PRESSURE: 170 MMHG | BODY MASS INDEX: 28.93 KG/M2 | DIASTOLIC BLOOD PRESSURE: 86 MMHG | OXYGEN SATURATION: 98 % | RESPIRATION RATE: 17 BRPM | HEART RATE: 83 BPM | TEMPERATURE: 97.9 F | WEIGHT: 180 LBS

## 2022-04-04 PROCEDURE — 99214 OFFICE O/P EST MOD 30 MIN: CPT

## 2022-04-04 NOTE — PHYSICAL EXAM
[General Appearance - Well Nourished] : well nourished [General Appearance - Well Developed] : well developed [Sclera] : the sclera and conjunctiva were normal [Hearing Threshold Finger Rub Not [place]] : hearing was normal [Neck Appearance] : the appearance of the neck was normal [Nail Clubbing] : no clubbing  or cyanosis of the fingernails [Musculoskeletal - Swelling] : no joint swelling seen [Motor Tone] : muscle strength and tone were normal [FreeTextEntry1] : s [] : no rash [Skin Lesions] : no skin lesions [Affect] : the affect was normal [Mood] : the mood was normal

## 2022-04-04 NOTE — ASSESSMENT
[FreeTextEntry1] : patient of DR Roberson with PMR and episodes of sweats associated with not feeling well and changes in BP \par \par --patient is to see endo r/o pheo \par --he is to cont prednisone 10 for now \par --daughter is to take patient to PMD for age appropriate mailgnancy screening .  apparently PSA normal ) had prostate ca) \par --repeat ESR/CRP

## 2022-04-04 NOTE — HISTORY OF PRESENT ILLNESS
[FreeTextEntry1] : Patient of DR Roberson \par \par patient was dx with PMR by DR Roberson.  he was started on prednisone and notice great imporvement.  he was tapered to 5mg and the pain worsen.  he went up to 10mg on his own.  Patient has alzheimers and daugther brings patient and complaints that her fathjer has intense body pain.  Patient disagrees.  he complains of episodes of sweats and not feeling well when this happens.  he also states that his BP changes when these sweats occur.  he feels dizzy.  he explains that has some joint pain that is expected for his age.  he state she had multiple shoudler surgeries.  he states that on prednisone he is much better.  he is having insomnia despite taking zolpidem \par \par he denies any HAs, vision changes, jaw claudication \par \par meds: \par zolpidem\par primidone \par nemantine\par doneorizil\par \par

## 2022-04-12 DIAGNOSIS — D53.9 NUTRITIONAL ANEMIA, UNSPECIFIED: ICD-10-CM

## 2022-04-12 LAB
ALBUMIN SERPL ELPH-MCNC: 4.6 G/DL
ALP BLD-CCNC: 81 U/L
ALT SERPL-CCNC: 27 U/L
ANION GAP SERPL CALC-SCNC: 14 MMOL/L
AST SERPL-CCNC: 31 U/L
BASOPHILS # BLD AUTO: 0.04 K/UL
BASOPHILS NFR BLD AUTO: 0.5 %
BILIRUB SERPL-MCNC: 0.2 MG/DL
BUN SERPL-MCNC: 42 MG/DL
CALCIUM SERPL-MCNC: 9.4 MG/DL
CHLORIDE SERPL-SCNC: 106 MMOL/L
CO2 SERPL-SCNC: 21 MMOL/L
CREAT SERPL-MCNC: 1.97 MG/DL
CRP SERPL-MCNC: 6 MG/L
EGFR: 32 ML/MIN/1.73M2
EOSINOPHIL # BLD AUTO: 0.17 K/UL
EOSINOPHIL NFR BLD AUTO: 2 %
GLUCOSE SERPL-MCNC: 128 MG/DL
HCT VFR BLD CALC: 33.4 %
HGB BLD-MCNC: 10.3 G/DL
IMM GRANULOCYTES NFR BLD AUTO: 1.9 %
LYMPHOCYTES # BLD AUTO: 1.07 K/UL
LYMPHOCYTES NFR BLD AUTO: 12.4 %
MAN DIFF?: NORMAL
MCHC RBC-ENTMCNC: 30.8 GM/DL
MCHC RBC-ENTMCNC: 32.9 PG
MCV RBC AUTO: 106.7 FL
MONOCYTES # BLD AUTO: 0.32 K/UL
MONOCYTES NFR BLD AUTO: 3.7 %
NEUTROPHILS # BLD AUTO: 6.86 K/UL
NEUTROPHILS NFR BLD AUTO: 79.5 %
PLATELET # BLD AUTO: 175 K/UL
POTASSIUM SERPL-SCNC: 4.7 MMOL/L
PROT SERPL-MCNC: 6.5 G/DL
RBC # BLD: 3.13 M/UL
RBC # FLD: 14.5 %
SODIUM SERPL-SCNC: 141 MMOL/L
WBC # FLD AUTO: 8.62 K/UL

## 2022-04-19 ENCOUNTER — APPOINTMENT (OUTPATIENT)
Dept: RHEUMATOLOGY | Facility: CLINIC | Age: 87
End: 2022-04-19
Payer: MEDICARE

## 2022-04-19 VITALS
SYSTOLIC BLOOD PRESSURE: 154 MMHG | RESPIRATION RATE: 17 BRPM | HEART RATE: 87 BPM | DIASTOLIC BLOOD PRESSURE: 70 MMHG | WEIGHT: 180 LBS | TEMPERATURE: 97.8 F | OXYGEN SATURATION: 98 % | BODY MASS INDEX: 28.93 KG/M2 | HEIGHT: 66 IN

## 2022-04-19 DIAGNOSIS — R61 GENERALIZED HYPERHIDROSIS: ICD-10-CM

## 2022-04-19 PROCEDURE — 99213 OFFICE O/P EST LOW 20 MIN: CPT

## 2022-04-19 RX ORDER — PREDNISONE 10 MG/1
10 TABLET ORAL DAILY
Qty: 30 | Refills: 3 | Status: DISCONTINUED | COMMUNITY
Start: 2021-12-10 | End: 2022-04-19

## 2022-04-19 NOTE — HISTORY OF PRESENT ILLNESS
[FreeTextEntry1] : Pt evaluated today for a f.u visit. \par Seen by Dr. Larson 04/2022 and recommended to f.u with endocrinology. \par Currently undergoing further work up at [facility]. \par Currently taking prednisone 10mg daily. Tolerating medication well. Denies side effects.

## 2022-04-19 NOTE — ASSESSMENT
[FreeTextEntry1] : Symptoms concerning for PMR\par Episodes of sweats associated with not feeling well and changes in BP \par \par - taper prednisone to 7.5mg daily \par - endocrinology f/u\par \par Discussed treatment plan with the patient and his daughter. The patient was given the opportunity to ask questions and all questions were answered to their satisfaction.\par

## 2022-04-19 NOTE — PHYSICAL EXAM
[General Appearance - Alert] : alert [General Appearance - Well Nourished] : well nourished [General Appearance - In No Acute Distress] : in no acute distress [General Appearance - Well Developed] : well developed [Sclera] : the sclera and conjunctiva were normal [Outer Ear] : the ears and nose were normal in appearance [Neck Appearance] : the appearance of the neck was normal [Heart Sounds] : normal S1 and S2 [Musculoskeletal - Swelling] : no joint swelling seen [] : no rash [No Focal Deficits] : no focal deficits [Oriented To Time, Place, And Person] : oriented to person, place, and time

## 2022-04-26 LAB
CREAT 24H UR-MCNC: 0.6 G/24 H
CREAT ?TM UR-MCNC: 52 MG/DL
PROT 24H UR-MRATE: 31 MG/DL
PROT ?TM UR-MCNC: 24 HR
PROT UR-MCNC: 388 MG/24 H
SPECIMEN VOL 24H UR: 1250 ML

## 2022-05-02 ENCOUNTER — APPOINTMENT (OUTPATIENT)
Dept: ENDOCRINOLOGY | Facility: CLINIC | Age: 87
End: 2022-05-02
Payer: MEDICARE

## 2022-05-02 VITALS
DIASTOLIC BLOOD PRESSURE: 80 MMHG | WEIGHT: 175 LBS | SYSTOLIC BLOOD PRESSURE: 138 MMHG | BODY MASS INDEX: 28.12 KG/M2 | HEIGHT: 66 IN

## 2022-05-02 DIAGNOSIS — R06.02 SHORTNESS OF BREATH: ICD-10-CM

## 2022-05-02 PROCEDURE — 99204 OFFICE O/P NEW MOD 45 MIN: CPT

## 2022-05-02 RX ORDER — FERROUS SULFATE 137(45) MG
142 (45 FE) TABLET, EXTENDED RELEASE ORAL
Qty: 30 | Refills: 0 | Status: ACTIVE | COMMUNITY
Start: 2021-12-10

## 2022-05-02 RX ORDER — SOLIFENACIN SUCCINATE 10 MG/1
10 TABLET ORAL
Qty: 30 | Refills: 0 | Status: ACTIVE | COMMUNITY
Start: 2021-12-02

## 2022-05-02 RX ORDER — CYCLOBENZAPRINE HYDROCHLORIDE 5 MG/1
5 TABLET, FILM COATED ORAL
Qty: 10 | Refills: 0 | Status: ACTIVE | COMMUNITY
Start: 2021-11-10

## 2022-05-02 RX ORDER — TAMSULOSIN HYDROCHLORIDE 0.4 MG/1
0.4 CAPSULE ORAL
Qty: 90 | Refills: 0 | Status: ACTIVE | COMMUNITY
Start: 2022-03-17

## 2022-05-02 RX ORDER — ERGOCALCIFEROL 1.25 MG/1
1.25 MG CAPSULE, LIQUID FILLED ORAL
Qty: 12 | Refills: 0 | Status: ACTIVE | COMMUNITY
Start: 2022-02-11

## 2022-05-02 NOTE — ASSESSMENT
[FreeTextEntry1] : 89 y.o. male with obesity and PMHx of polymyalgia rheumatica on chronic steroids referred from rheumatology office for evaluation of hot flashes/perspiration. Patient presents with his daughter and explains he has been having problems with intermittent sweating and hot flashes for long time but symptoms worsened over the last 1 year. Patient has previous endocrinologist Dr. Urbina. Patient states the symptoms are on/off and happened randomly during the day. He reports some subjective FREDERICK but denies chest pain or palpitations. Medication list has not been changed for the last 1 year. He is taking some vitamins and supplements that his daughter will send us the complete list. \par \par # Long standing intermittent perspiration with hot flashes. \par Unclear etiology. \par Borderline HTN\par Plasma Metanephrines wnl (3/31/22)\par Will obtain blood work again including TFTs and Testosterone.\par Will consider carcinoid work up. 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 \par \par # FREDERICK and systolic murmur\par B/l LE pitting edema\par Will obtain CMP, CBC and Lipids\par Will refer to cardiology. \par \par RTC in 2 week, the above plan was discussed with patient daughter. \par \par \par \par \par

## 2022-05-02 NOTE — REVIEW OF SYSTEMS
[Fatigue] : fatigue [Blurred Vision] : blurred vision [Lower Ext Edema] : lower extremity edema [SOB on Exertion] : shortness of breath on exertion [Myalgia] : myalgia  [Insomnia] : insomnia [As Noted in HPI] : as noted in HPI [Negative] : Heme/Lymph [FreeTextEntry4] : Reports increased phlegm

## 2022-05-06 ENCOUNTER — NON-APPOINTMENT (OUTPATIENT)
Age: 87
End: 2022-05-06

## 2022-05-09 LAB
25(OH)D3 SERPL-MCNC: 28.1 NG/ML
ALBUMIN SERPL ELPH-MCNC: 4 G/DL
ALP BLD-CCNC: 73 U/L
ALT SERPL-CCNC: 13 U/L
ANION GAP SERPL CALC-SCNC: 14 MMOL/L
AST SERPL-CCNC: 26 U/L
BASOPHILS # BLD AUTO: 0.06 K/UL
BASOPHILS NFR BLD AUTO: 0.5 %
BILIRUB SERPL-MCNC: 0.3 MG/DL
BUN SERPL-MCNC: 37 MG/DL
CALCIUM SERPL-MCNC: 8.6 MG/DL
CHLORIDE SERPL-SCNC: 105 MMOL/L
CO2 SERPL-SCNC: 22 MMOL/L
CREAT SERPL-MCNC: 1.88 MG/DL
EGFR: 34 ML/MIN/1.73M2
EOSINOPHIL # BLD AUTO: 1.38 K/UL
EOSINOPHIL NFR BLD AUTO: 11.9 %
GLUCOSE SERPL-MCNC: 178 MG/DL
HCT VFR BLD CALC: 32.8 %
HGB BLD-MCNC: 10 G/DL
IMM GRANULOCYTES NFR BLD AUTO: 1 %
LYMPHOCYTES # BLD AUTO: 1.16 K/UL
LYMPHOCYTES NFR BLD AUTO: 10 %
MAN DIFF?: NORMAL
MCHC RBC-ENTMCNC: 30.5 GM/DL
MCHC RBC-ENTMCNC: 31.5 PG
MCV RBC AUTO: 103.5 FL
MONOCYTES # BLD AUTO: 0.88 K/UL
MONOCYTES NFR BLD AUTO: 7.6 %
NEUTROPHILS # BLD AUTO: 7.95 K/UL
NEUTROPHILS NFR BLD AUTO: 69 %
PLATELET # BLD AUTO: 157 K/UL
POTASSIUM SERPL-SCNC: 3.7 MMOL/L
PROLACTIN SERPL-MCNC: 54.9 NG/ML
PROT SERPL-MCNC: 6 G/DL
RBC # BLD: 3.17 M/UL
RBC # FLD: 13.7 %
SODIUM SERPL-SCNC: 141 MMOL/L
T4 FREE SERPL-MCNC: 0.8 NG/DL
TSH SERPL-ACNC: 0.47 UIU/ML
WBC # FLD AUTO: 11.55 K/UL

## 2022-05-11 ENCOUNTER — LABORATORY RESULT (OUTPATIENT)
Age: 87
End: 2022-05-11

## 2022-05-16 ENCOUNTER — APPOINTMENT (OUTPATIENT)
Dept: RHEUMATOLOGY | Facility: CLINIC | Age: 87
End: 2022-05-16
Payer: MEDICARE

## 2022-05-16 VITALS
TEMPERATURE: 98.2 F | HEIGHT: 66 IN | DIASTOLIC BLOOD PRESSURE: 72 MMHG | SYSTOLIC BLOOD PRESSURE: 138 MMHG | RESPIRATION RATE: 17 BRPM | WEIGHT: 176 LBS | HEART RATE: 100 BPM | OXYGEN SATURATION: 97 % | BODY MASS INDEX: 28.28 KG/M2

## 2022-05-16 DIAGNOSIS — R53.83 OTHER FATIGUE: ICD-10-CM

## 2022-05-16 DIAGNOSIS — M25.60 STIFFNESS OF UNSPECIFIED JOINT, NOT ELSEWHERE CLASSIFIED: ICD-10-CM

## 2022-05-16 DIAGNOSIS — M25.50 PAIN IN UNSPECIFIED JOINT: ICD-10-CM

## 2022-05-16 PROCEDURE — 99214 OFFICE O/P EST MOD 30 MIN: CPT

## 2022-05-16 RX ORDER — PREDNISONE 5 MG/1
5 TABLET ORAL
Qty: 60 | Refills: 2 | Status: DISCONTINUED | COMMUNITY
Start: 2022-01-03 | End: 2022-05-16

## 2022-05-16 RX ORDER — ZOLPIDEM TARTRATE 10 MG/1
10 TABLET ORAL
Qty: 30 | Refills: 0 | Status: DISCONTINUED | COMMUNITY
Start: 2022-01-29 | End: 2022-05-16

## 2022-05-16 RX ORDER — PREDNISONE 1 MG/1
1 TABLET ORAL
Qty: 90 | Refills: 2 | Status: ACTIVE | COMMUNITY
Start: 2022-05-16 | End: 1900-01-01

## 2022-05-16 RX ORDER — NAPROXEN 500 MG/1
500 TABLET ORAL
Qty: 10 | Refills: 0 | Status: DISCONTINUED | COMMUNITY
Start: 2021-11-10 | End: 2022-05-16

## 2022-05-16 RX ORDER — OXYBUTYNIN CHLORIDE 5 MG/1
5 TABLET, EXTENDED RELEASE ORAL
Qty: 90 | Refills: 0 | Status: DISCONTINUED | COMMUNITY
Start: 2021-11-29 | End: 2022-05-16

## 2022-05-16 NOTE — HISTORY OF PRESENT ILLNESS
[FreeTextEntry1] : Pt presenting today for a f.u visit for PMR. \par Doing well on prednisone 5mg daily. Tolerating medication well. Denies side effects. \par NO acute complaints today.

## 2022-05-16 NOTE — PHYSICAL EXAM
[General Appearance - Alert] : alert [General Appearance - In No Acute Distress] : in no acute distress [General Appearance - Well Nourished] : well nourished [General Appearance - Well Developed] : well developed [Sclera] : the sclera and conjunctiva were normal [Outer Ear] : the ears and nose were normal in appearance [Neck Appearance] : the appearance of the neck was normal [Heart Sounds] : normal S1 and S2 [Musculoskeletal - Swelling] : no joint swelling seen [] : no rash [No Focal Deficits] : no focal deficits [Oriented To Time, Place, And Person] : oriented to person, place, and time

## 2022-05-16 NOTE — ASSESSMENT
[FreeTextEntry1] : Symptoms concerning for PMR\par Episodes of sweats associated with not feeling well and changes in BP \par \par - taper prednisone to 5mg daily \par - endocrinology f/u\par - repeat labs prior to f.u\par \par Discussed treatment plan with the patient and his daughter. The patient was given the opportunity to ask questions and all questions were answered to their satisfaction.\par

## 2022-05-17 ENCOUNTER — NON-APPOINTMENT (OUTPATIENT)
Age: 87
End: 2022-05-17

## 2022-05-17 ENCOUNTER — APPOINTMENT (OUTPATIENT)
Dept: OPHTHALMOLOGY | Facility: CLINIC | Age: 87
End: 2022-05-17
Payer: MEDICARE

## 2022-05-17 PROCEDURE — 92133 CPTRZD OPH DX IMG PST SGM ON: CPT

## 2022-05-17 PROCEDURE — 92004 COMPRE OPH EXAM NEW PT 1/>: CPT

## 2022-05-17 PROCEDURE — 76514 ECHO EXAM OF EYE THICKNESS: CPT

## 2022-05-17 PROCEDURE — 92083 EXTENDED VISUAL FIELD XM: CPT

## 2022-05-17 PROCEDURE — 92020 GONIOSCOPY: CPT

## 2022-05-19 ENCOUNTER — APPOINTMENT (OUTPATIENT)
Dept: ENDOCRINOLOGY | Facility: CLINIC | Age: 87
End: 2022-05-19
Payer: MEDICARE

## 2022-05-19 VITALS — BODY MASS INDEX: 29.25 KG/M2 | WEIGHT: 182 LBS | HEIGHT: 66 IN

## 2022-05-19 VITALS — HEART RATE: 86 BPM | DIASTOLIC BLOOD PRESSURE: 60 MMHG | SYSTOLIC BLOOD PRESSURE: 146 MMHG | OXYGEN SATURATION: 95 %

## 2022-05-19 DIAGNOSIS — R23.2 FLUSHING: ICD-10-CM

## 2022-05-19 DIAGNOSIS — M35.3 POLYMYALGIA RHEUMATICA: ICD-10-CM

## 2022-05-19 LAB
ACTH SER-ACNC: 43 PG/ML
ACTH SER-ACNC: 7.3 PG/ML
CORTIS SERPL-MCNC: 13.5 UG/DL
FSH SERPL-MCNC: 2.7 IU/L
IGF-1 INTERP: NORMAL
IGF-1 INTERP: NORMAL
IGF-I BLD-MCNC: 60 NG/ML
IGF-I BLD-MCNC: 81 NG/ML
LH SERPL-ACNC: 1.4 IU/L
PROLACTIN SERPL-MCNC: 67.7 NG/ML
PSA FREE FLD-MCNC: NORMAL %
PSA FREE SERPL-MCNC: <0.01 NG/ML
PSA SERPL-MCNC: <0.01 NG/ML
T4 FREE SERPL-MCNC: 0.8 NG/DL
TESTOST FREE SERPL-MCNC: 0.5 PG/ML
TESTOST FREE SERPL-MCNC: 0.8 PG/ML
TESTOST SERPL-MCNC: <2.5 NG/DL
TESTOST SERPL-MCNC: <2.5 NG/DL
TSH SERPL-ACNC: 0.47 UIU/ML

## 2022-05-19 PROCEDURE — 99214 OFFICE O/P EST MOD 30 MIN: CPT

## 2022-05-19 RX ORDER — ZOLPIDEM TARTRATE 5 MG/1
5 TABLET ORAL
Refills: 0 | Status: ACTIVE | COMMUNITY

## 2022-05-19 RX ORDER — MEMANTINE HYDROCHLORIDE 10 MG/1
10 TABLET, FILM COATED ORAL
Refills: 0 | Status: ACTIVE | COMMUNITY

## 2022-05-19 RX ORDER — PRIMIDONE 50 MG/1
50 TABLET ORAL
Refills: 0 | Status: ACTIVE | COMMUNITY

## 2022-05-19 RX ORDER — PREDNISONE 2.5 MG/1
2.5 TABLET ORAL
Refills: 0 | Status: ACTIVE | COMMUNITY

## 2022-05-19 RX ORDER — DONEPEZIL HYDROCHLORIDE 10 MG/1
10 TABLET ORAL
Refills: 0 | Status: ACTIVE | COMMUNITY

## 2022-05-19 NOTE — REVIEW OF SYSTEMS
[Fatigue] : fatigue [Nocturia] : nocturia [Hesistancy] : hesitancy [Joint Pain] : joint pain [As Noted in HPI] : as noted in HPI [Negative] : Gastrointestinal

## 2022-05-19 NOTE — HISTORY OF PRESENT ILLNESS
[FreeTextEntry1] : 89 y.o. male with PMHx of polymyalgia rheumatica on chronic steroids initially referred from rheumatology office for evaluation of hot flashes and increased perspiration over the last 1 year. 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

## 2022-05-19 NOTE — PHYSICAL EXAM
[Alert] : alert [Obese] : obese [No Acute Distress] : no acute distress [EOMI] : extra ocular movement intact [PERRL] : pupils equal, round and reactive to light [No Proptosis] : no proptosis [No Lid Lag] : no lid lag [Normal Outer Ear/Nose] : the ears and nose were normal in appearance [Normal Hearing] : hearing was normal [Normal Oropharynx] : the oropharynx was normal [Thyroid Not Enlarged] : the thyroid was not enlarged [No Thyroid Nodules] : no palpable thyroid nodules [No Respiratory Distress] : no respiratory distress [Clear to Auscultation] : lungs were clear to auscultation bilaterally [Normal S1, S2] : normal S1 and S2 [Normal Rate] : heart rate was normal [Regular Rhythm] : with a regular rhythm [Normal Bowel Sounds] : normal bowel sounds [Not Tender] : non-tender [Soft] : abdomen soft [Normal Supraclavicular Nodes] : no supraclavicular lymphadenopathy [Normal Anterior Cervical Nodes] : no anterior cervical lymphadenopathy [No Rash] : no rash [No Skin Lesions] : no skin lesions [No Tremors] : no tremors [Oriented x3] : oriented to person, place, and time [Normal Affect] : the affect was normal [Normal Mood] : the mood was normal [Abdominal Striae] : no abdominal striae [Acanthosis Nigricans] : no acanthosis nigricans [de-identified] : In office visual fields does not suggest defect  [de-identified] : Harsh systolic murmur [de-identified] : B/l pitting edema of LE

## 2022-05-19 NOTE — ASSESSMENT
[FreeTextEntry1] : 89 y.o. male with PMHx of polymyalgia rheumatica on chronic steroids initially referred from rheumatology office for evaluation of hot flashes and increased perspiration over the last 1 year. Pituitary MRI (3/22/22) reveals peripherally enhancing sellar lesion (1 cm in height, 0.7 cm AP and 0.9 cm in transverse diameter) with suprasellar extension and mild mass effect on the optic chiasm suspicious for macroadenoma with chronic hemorrhage vs proteinaceous cyst vs Rathke's cleft cyst.\par Hormonal work up reveals:\par Elevated prolactin 67<==55\par Severe hypogonadotropic  hypogonadism with T. Testosterone <2.5 on two occasions and Free Testosterone of 0.5\par Borderline TFTs suggestive of secondary hypothyroidism\par AM Cortisol is unreliable d/t patient been on chronic steroids\par \par Plan:\par Will obtain macroprolactin and prolactin dilution study\par Patient was referred to ophthalmologist for visual fields testing - results pending (patient daughter states that patient will be referred to neuro opthalmology for further evaluation.)\par Once we'll receive the reports from ophthalmology we'll obtain neurosurgery opinion.\par Medical treatment to be discussed after above evaluation.\par Will consider initiation of small dose dopamine receptor agonist to reduce tumor size and improve androgen axis. \par Not a good candidate for testosterone RT d/t Hx of prostate cancer.\par Will f/u in about 3 weeks or earlier, after above evaluations are done. \par \par \par \par \par

## 2022-05-19 NOTE — REASON FOR VISIT
[Follow - Up] : a follow-up visit [Pituitary Evaluation/ Disorder] : pituitary evaluation/disorder [Hypogonadism] : hypogonadism

## 2022-05-23 LAB
CORTICOSTEROID BIND GLOBULIN: 2 MG/DL
CORTIS SERPL-MCNC: 14 UG/DL
CORTISOL, FREE: 2.2 UG/DL
MONOMERIC PROLACTIN (ICMA)*: 58.9 NG/ML
PERCENT MACROPROLACTIN: 14 %
PFCX: 16 %
PROLACTIN SERPL-MCNC: 57.6 NG/ML
PROLACTIN SERPL-MCNC: 61.1 NG/ML
PROLACTIN, SERUM (ICMA)*: 68.6 NG/ML

## 2022-05-25 ENCOUNTER — APPOINTMENT (OUTPATIENT)
Dept: OPHTHALMOLOGY | Facility: CLINIC | Age: 87
End: 2022-05-25
Payer: MEDICARE

## 2022-05-25 ENCOUNTER — NON-APPOINTMENT (OUTPATIENT)
Age: 87
End: 2022-05-25

## 2022-05-25 PROCEDURE — 92133 CPTRZD OPH DX IMG PST SGM ON: CPT

## 2022-05-25 PROCEDURE — 92083 EXTENDED VISUAL FIELD XM: CPT

## 2022-05-25 PROCEDURE — 99214 OFFICE O/P EST MOD 30 MIN: CPT

## 2022-05-31 LAB
MONOMERIC PROLACTIN (ICMA)*: 49.4 NG/ML
PERCENT MACROPROLACTIN: 15 %
PROLACTIN, SERUM (ICMA)*: 58.3 NG/ML

## 2022-06-01 ENCOUNTER — APPOINTMENT (OUTPATIENT)
Dept: RHEUMATOLOGY | Facility: CLINIC | Age: 87
End: 2022-06-01

## 2022-06-13 ENCOUNTER — APPOINTMENT (OUTPATIENT)
Dept: RHEUMATOLOGY | Facility: CLINIC | Age: 87
End: 2022-06-13

## 2022-06-15 ENCOUNTER — APPOINTMENT (OUTPATIENT)
Dept: SPINE | Facility: CLINIC | Age: 87
End: 2022-06-15
Payer: MEDICARE

## 2022-06-15 VITALS
OXYGEN SATURATION: 99 % | WEIGHT: 182 LBS | BODY MASS INDEX: 29.25 KG/M2 | DIASTOLIC BLOOD PRESSURE: 70 MMHG | HEART RATE: 66 BPM | SYSTOLIC BLOOD PRESSURE: 173 MMHG | HEIGHT: 66 IN

## 2022-06-15 PROCEDURE — 99204 OFFICE O/P NEW MOD 45 MIN: CPT

## 2022-06-23 ENCOUNTER — APPOINTMENT (OUTPATIENT)
Dept: ENDOCRINOLOGY | Facility: CLINIC | Age: 87
End: 2022-06-23
Payer: MEDICARE

## 2022-06-23 ENCOUNTER — APPOINTMENT (OUTPATIENT)
Dept: ENDOCRINOLOGY | Facility: CLINIC | Age: 87
End: 2022-06-23

## 2022-06-23 VITALS — WEIGHT: 179 LBS | HEIGHT: 66 IN | BODY MASS INDEX: 28.77 KG/M2

## 2022-06-23 VITALS — HEART RATE: 55 BPM | DIASTOLIC BLOOD PRESSURE: 50 MMHG | OXYGEN SATURATION: 95 % | SYSTOLIC BLOOD PRESSURE: 100 MMHG

## 2022-06-23 DIAGNOSIS — E03.9 HYPOTHYROIDISM, UNSPECIFIED: ICD-10-CM

## 2022-06-23 DIAGNOSIS — E22.1 HYPERPROLACTINEMIA: ICD-10-CM

## 2022-06-23 PROCEDURE — 99214 OFFICE O/P EST MOD 30 MIN: CPT

## 2022-06-23 RX ORDER — DEXAMETHASONE 4 MG/1
4 TABLET ORAL
Qty: 5 | Refills: 0 | Status: ACTIVE | COMMUNITY
Start: 2022-06-07

## 2022-06-23 RX ORDER — LANCETS 33 GAUGE
EACH MISCELLANEOUS
Qty: 100 | Refills: 0 | Status: ACTIVE | COMMUNITY
Start: 2022-06-18

## 2022-06-23 RX ORDER — LORATADINE 10 MG/1
10 TABLET ORAL
Qty: 10 | Refills: 0 | Status: ACTIVE | COMMUNITY
Start: 2022-06-07

## 2022-06-23 RX ORDER — TORSEMIDE 10 MG/1
10 TABLET ORAL
Qty: 30 | Refills: 0 | Status: ACTIVE | COMMUNITY
Start: 2022-05-23

## 2022-06-23 NOTE — ASSESSMENT
[FreeTextEntry1] : 89 y.o. male with PMHx of polymyalgia rheumatica on chronic steroids initially presented for evaluation of hot flashes and increased perspiration for 1 year. Pituitary MRI (3/22/22) revealed sellar lesion (1 cm in height, 0.7 cm AP and 0.9 cm in transverse diameter) with suprasellar extension and mild mass effect on the optic chiasm. Hormonal work up revealed elevated prolactin 55 - 67 and severely decreased Testosterone <2.5 and TFTs suggestive of secondary hypothyroidism. AM Cortisol is unreliable d/t patient been on chronic steroids.\par \par # Pituitary Macroadenoma\par Opthalmology evaluation revealed bitemporal visual field defects, secondary to chiasm compression of pituitary neoplasm. \par Patient was started on Cabergoline 0.25 mg bi weekly with expectations to alleviate the symptoms of hot flashes and extensive perspiration if the tumor decreases in size. Patient is not a candidate for testosterone replacement d/t Hx of prostate cancer.\par In the mean time patient was seen by Neurosurgeon - no immediate plan for surgery.\par Patient presents with his daughter today.\par Reports no significant improvement of the symptoms. \par Will repeat Labs in 3 weeks and will follow for adjustments\par Patient will follow up with Neurosurgery in the beginning of August. \par \par # Secondary hypothyroidism\par Will start low dose Levothyroxine 25 mcg daily\par Cortisol level is unreliable but unlikely to precipitated adrenal crisis. He is on chronic steroids\par Will follow up FT4\par \par RTC in 8 weeks with repeat blood work \par \par \par \par \par \par \par

## 2022-06-23 NOTE — PHYSICAL EXAM
[Alert] : alert [Obese] : obese [No Acute Distress] : no acute distress [EOMI] : extra ocular movement intact [PERRL] : pupils equal, round and reactive to light [No Proptosis] : no proptosis [No Lid Lag] : no lid lag [Normal Outer Ear/Nose] : the ears and nose were normal in appearance [Normal Hearing] : hearing was normal [Normal Oropharynx] : the oropharynx was normal [Thyroid Not Enlarged] : the thyroid was not enlarged [No Thyroid Nodules] : no palpable thyroid nodules [No Respiratory Distress] : no respiratory distress [Clear to Auscultation] : lungs were clear to auscultation bilaterally [Normal S1, S2] : normal S1 and S2 [Normal Rate] : heart rate was normal [Regular Rhythm] : with a regular rhythm [Normal Bowel Sounds] : normal bowel sounds [Not Tender] : non-tender [Soft] : abdomen soft [Normal Supraclavicular Nodes] : no supraclavicular lymphadenopathy [Normal Anterior Cervical Nodes] : no anterior cervical lymphadenopathy [No Rash] : no rash [No Skin Lesions] : no skin lesions [No Tremors] : no tremors [Oriented x3] : oriented to person, place, and time [Normal Affect] : the affect was normal [Normal Mood] : the mood was normal [Abdominal Striae] : no abdominal striae [Acanthosis Nigricans] : no acanthosis nigricans [de-identified] : In office visual fields does not suggest defect  [de-identified] : Harsh systolic murmur [de-identified] : B/l pitting edema of LE

## 2022-07-14 LAB
PROLACTIN SERPL-MCNC: 0.2 NG/ML
PROLACTIN SERPL-MCNC: 0.9 NG/ML
T3FREE SERPL-MCNC: 1.45 PG/ML
T4 FREE SERPL-MCNC: 0.5 NG/DL
TSH SERPL-ACNC: 0.21 UIU/ML

## 2022-07-22 LAB
TESTOST FREE SERPL-MCNC: 0.3 PG/ML
TESTOST SERPL-MCNC: <2.5 NG/DL

## 2022-08-05 ENCOUNTER — APPOINTMENT (OUTPATIENT)
Dept: ENDOCRINOLOGY | Facility: CLINIC | Age: 87
End: 2022-08-05

## 2022-08-10 ENCOUNTER — APPOINTMENT (OUTPATIENT)
Dept: SPINE | Facility: CLINIC | Age: 87
End: 2022-08-10

## 2022-08-22 ENCOUNTER — APPOINTMENT (OUTPATIENT)
Dept: ENDOCRINOLOGY | Facility: CLINIC | Age: 87
End: 2022-08-22

## 2022-08-22 VITALS
WEIGHT: 172 LBS | HEIGHT: 66 IN | BODY MASS INDEX: 27.64 KG/M2 | HEART RATE: 60 BPM | SYSTOLIC BLOOD PRESSURE: 124 MMHG | DIASTOLIC BLOOD PRESSURE: 70 MMHG

## 2022-08-22 DIAGNOSIS — D35.2 BENIGN NEOPLASM OF PITUITARY GLAND: ICD-10-CM

## 2022-08-22 DIAGNOSIS — E29.1 TESTICULAR HYPOFUNCTION: ICD-10-CM

## 2022-08-22 PROCEDURE — 99215 OFFICE O/P EST HI 40 MIN: CPT

## 2022-08-22 RX ORDER — SENNOSIDES 8.6 MG/1
8.6 TABLET, COATED ORAL
Qty: 30 | Refills: 0 | Status: ACTIVE | COMMUNITY
Start: 2022-08-04

## 2022-08-22 RX ORDER — LEVOTHYROXINE SODIUM 0.03 MG/1
25 TABLET ORAL DAILY
Qty: 90 | Refills: 1 | Status: DISCONTINUED | COMMUNITY
Start: 2022-06-23 | End: 2022-08-22

## 2022-08-22 RX ORDER — DOCUSATE SODIUM 100 MG/1
100 CAPSULE, LIQUID FILLED ORAL
Qty: 14 | Refills: 0 | Status: ACTIVE | COMMUNITY
Start: 2022-08-04

## 2022-08-22 RX ORDER — LORATADINE 10 MG
17 TABLET,DISINTEGRATING ORAL
Qty: 30 | Refills: 0 | Status: ACTIVE | COMMUNITY
Start: 2022-08-04

## 2022-08-22 RX ORDER — BACITRACIN 500 [IU]/G
500 OINTMENT TOPICAL
Qty: 28 | Refills: 0 | Status: ACTIVE | COMMUNITY
Start: 2022-08-04

## 2022-08-22 RX ORDER — BLOOD SUGAR DIAGNOSTIC
STRIP MISCELLANEOUS
Qty: 300 | Refills: 0 | Status: ACTIVE | COMMUNITY
Start: 2022-06-28

## 2022-08-22 RX ORDER — GUAIFENESIN 600 MG/1
600 TABLET, MULTILAYER, EXTENDED RELEASE ORAL
Qty: 20 | Refills: 0 | Status: ACTIVE | COMMUNITY
Start: 2022-08-04

## 2022-08-22 RX ORDER — AZITHROMYCIN 250 MG/1
250 TABLET, FILM COATED ORAL
Qty: 6 | Refills: 0 | Status: DISCONTINUED | COMMUNITY
Start: 2022-06-07 | End: 2022-08-22

## 2022-08-22 RX ORDER — FOLIC ACID 1 MG/1
1 TABLET ORAL
Qty: 90 | Refills: 0 | Status: ACTIVE | COMMUNITY
Start: 2022-07-14

## 2022-08-22 RX ORDER — NEOMYCIN SULFATE, POLYMYXIN B SULFATE, HYDROCORTISONE 3.5; 10000; 1 MG/ML; [USP'U]/ML; MG/ML
1 SOLUTION/ DROPS AURICULAR (OTIC)
Qty: 10 | Refills: 0 | Status: ACTIVE | COMMUNITY
Start: 2022-08-15

## 2022-08-22 NOTE — PHYSICAL EXAM
[Alert] : alert [Obese] : obese [No Acute Distress] : no acute distress [EOMI] : extra ocular movement intact [PERRL] : pupils equal, round and reactive to light [No Proptosis] : no proptosis [No Lid Lag] : no lid lag [Normal Outer Ear/Nose] : the ears and nose were normal in appearance [Normal Hearing] : hearing was normal [Normal Oropharynx] : the oropharynx was normal [Thyroid Not Enlarged] : the thyroid was not enlarged [No Thyroid Nodules] : no palpable thyroid nodules [No Respiratory Distress] : no respiratory distress [Clear to Auscultation] : lungs were clear to auscultation bilaterally [Normal S1, S2] : normal S1 and S2 [Normal Rate] : heart rate was normal [Regular Rhythm] : with a regular rhythm [Not Tender] : non-tender [Soft] : abdomen soft [No Rash] : no rash [No Skin Lesions] : no skin lesions [No Tremors] : no tremors [Oriented x3] : oriented to person, place, and time [No Clubbing, Cyanosis] : no clubbing  or cyanosis of the fingernails [No Joint Swelling] : no joint swelling seen [Abdominal Striae] : no abdominal striae [Acanthosis Nigricans] : no acanthosis nigricans [de-identified] : Generalized weakness, Respirating profusely.  [de-identified] : Harsh systolic murmur [de-identified] : B/l pitting edema of LE

## 2022-08-22 NOTE — REVIEW OF SYSTEMS
[Fatigue] : fatigue [Decreased Appetite] : decreased appetite [Depression] : depression [Negative] : Neurological [FreeTextEntry2] : Exhausted with low energy, profusely sweating

## 2022-08-22 NOTE — ASSESSMENT
[FreeTextEntry1] : 89 y.o. male with PMHx of polymyalgia rheumatica on chronic steroids initially presented for evaluation of hot flashes and increased perspiration for 1 year. Pituitary MRI (3/22/22) revealed sellar lesion (1 cm in height, 0.7 cm AP and 0.9 cm in transverse diameter) with suprasellar extension and mild mass effect on the optic chiasm. Hormonal work up revealed elevated prolactin 55 - 67 and Hypopituitarism with severely reduced Testosterone <2.5 and TFTs suggestive of secondary hypothyroidism. Patient was started on Cabergoline and Levothyroxine. Patient has Hx of prostates cancer. 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 Patient was seen by  Dr. Estrada with neurosurgery but appeared not to be an immediate candidate for surgery.\par Cabergoline was increased to 0.5 mg bi weekly, which did not alleviate the symptoms of hot flashes and perspiration. \par Then patient was seen by Dr. Real with Neurosurgery who refer him to Dr. Portillo with Radiation Oncology at [facility] where patient underwent 3 cycles of radiation therapy of the pituitary lesion (last one was 1 week ago). NO reports available.\par Patient now presents with his daughter and grand son and all report no improvement after the RT.\par Patient and family now request if Testosterone replacement therapy can be considered. They are aware of the risk for prostate cancer recurrence with such a therapy and would agree to try it in order to alleviated his symptoms of hypogonadism and severely reduced quality of life. \par \par Will repeat blood work and after evaluation will discuss the treatment possibilities. \par Patient (and daughter) will be called for further plan for management. \par Patient and family agree with the above plan \par \par # Secondary hypothyroidism\par Dose of Levothyroxine increased to 88 mcg daily (at [facility] stay)\par Will follow up FT4\par \par \par \par \par \par \par \par \par

## 2022-08-27 LAB
ALBUMIN SERPL ELPH-MCNC: 4.7 G/DL
ALP BLD-CCNC: 95 U/L
ALT SERPL-CCNC: 13 U/L
ANION GAP SERPL CALC-SCNC: 16 MMOL/L
AST SERPL-CCNC: 21 U/L
BASOPHILS # BLD AUTO: 0.08 K/UL
BASOPHILS NFR BLD AUTO: 1 %
BILIRUB SERPL-MCNC: 0.2 MG/DL
BUN SERPL-MCNC: 63 MG/DL
CALCIUM SERPL-MCNC: 9.4 MG/DL
CHLORIDE SERPL-SCNC: 108 MMOL/L
CO2 SERPL-SCNC: 16 MMOL/L
CREAT SERPL-MCNC: 2.28 MG/DL
EGFR: 27 ML/MIN/1.73M2
EOSINOPHIL # BLD AUTO: 1.27 K/UL
EOSINOPHIL NFR BLD AUTO: 15.9 %
FSH SERPL-MCNC: 3.3 IU/L
GLUCOSE SERPL-MCNC: 94 MG/DL
HCT VFR BLD CALC: 31 %
HGB BLD-MCNC: 10.1 G/DL
IGF-1 INTERP: NORMAL
IGF-I BLD-MCNC: 111 NG/ML
IMM GRANULOCYTES NFR BLD AUTO: 0.5 %
LH SERPL-ACNC: 1 IU/L
LYMPHOCYTES # BLD AUTO: 2.83 K/UL
LYMPHOCYTES NFR BLD AUTO: 35.5 %
MAN DIFF?: NORMAL
MCHC RBC-ENTMCNC: 31.5 PG
MCHC RBC-ENTMCNC: 32.6 GM/DL
MCV RBC AUTO: 96.6 FL
MONOCYTES # BLD AUTO: 0.85 K/UL
MONOCYTES NFR BLD AUTO: 10.7 %
NEUTROPHILS # BLD AUTO: 2.91 K/UL
NEUTROPHILS NFR BLD AUTO: 36.4 %
PLATELET # BLD AUTO: 145 K/UL
POTASSIUM SERPL-SCNC: 4.3 MMOL/L
PROT SERPL-MCNC: 7 G/DL
PSA FREE FLD-MCNC: NORMAL %
PSA FREE SERPL-MCNC: <0.01 NG/ML
PSA SERPL-MCNC: <0.01 NG/ML
RBC # BLD: 3.21 M/UL
RBC # FLD: 14.6 %
SODIUM SERPL-SCNC: 140 MMOL/L
WBC # FLD AUTO: 7.98 K/UL

## 2022-08-31 RX ORDER — TESTOSTERONE 2 MG/D
2 PATCH TRANSDERMAL
Qty: 30 | Refills: 0 | Status: ACTIVE | COMMUNITY
Start: 2022-08-31 | End: 1900-01-01

## 2022-08-31 RX ORDER — CABERGOLINE 0.5 MG/1
0.5 TABLET ORAL
Qty: 30 | Refills: 0 | Status: ACTIVE | COMMUNITY
Start: 2022-06-01 | End: 1900-01-01

## 2022-09-01 ENCOUNTER — NON-APPOINTMENT (OUTPATIENT)
Age: 87
End: 2022-09-01

## 2022-09-02 ENCOUNTER — APPOINTMENT (OUTPATIENT)
Dept: ENDOCRINOLOGY | Facility: CLINIC | Age: 87
End: 2022-09-02

## 2022-09-02 PROCEDURE — 96372 THER/PROPH/DIAG INJ SC/IM: CPT

## 2022-09-02 RX ORDER — TESTOSTERONE CYPIONATE 200 MG/ML
200 INJECTION, SOLUTION INTRAMUSCULAR
Refills: 0 | Status: COMPLETED | OUTPATIENT
Start: 2022-09-02

## 2022-09-02 RX ADMIN — TESTOSTERONE CYPIONATE 0 MG/ML: 200 INJECTION INTRAMUSCULAR at 00:00

## 2022-09-14 ENCOUNTER — APPOINTMENT (OUTPATIENT)
Dept: ENDOCRINOLOGY | Facility: CLINIC | Age: 87
End: 2022-09-14

## 2022-09-14 PROCEDURE — 96372 THER/PROPH/DIAG INJ SC/IM: CPT | Mod: NC

## 2022-09-14 RX ORDER — TESTOSTERONE CYPIONATE 200 MG/ML
200 INJECTION, SOLUTION INTRAMUSCULAR
Qty: 2 | Refills: 0 | Status: ACTIVE | COMMUNITY
Start: 2022-09-01

## 2022-09-14 RX ORDER — TESTOSTERONE CYPIONATE 200 MG/ML
200 INJECTION, SOLUTION INTRAMUSCULAR
Refills: 0 | Status: COMPLETED | OUTPATIENT
Start: 2022-09-14

## 2022-09-14 RX ADMIN — TESTOSTERONE CYPIONATE 0 MG/ML: 200 INJECTION INTRAMUSCULAR at 00:00

## 2022-09-21 NOTE — ASU PATIENT PROFILE, ADULT - AS SC BRADEN FRICTION
Detail Level: Simple Price (Do Not Change): 0.00 Instructions: This plan will send the code FBSE to the PM system.  DO NOT or CHANGE the price. (3) no apparent problem

## 2022-09-22 ENCOUNTER — APPOINTMENT (OUTPATIENT)
Dept: ENDOCRINOLOGY | Facility: CLINIC | Age: 87
End: 2022-09-22

## 2022-09-30 ENCOUNTER — APPOINTMENT (OUTPATIENT)
Dept: ENDOCRINOLOGY | Facility: CLINIC | Age: 87
End: 2022-09-30

## 2022-09-30 PROCEDURE — 96372 THER/PROPH/DIAG INJ SC/IM: CPT

## 2022-09-30 RX ORDER — TESTOSTERONE CYPIONATE 200 MG/ML
200 INJECTION, SOLUTION INTRAMUSCULAR
Refills: 0 | Status: COMPLETED | OUTPATIENT
Start: 2022-09-30

## 2022-09-30 RX ADMIN — TESTOSTERONE CYPIONATE 0 MG/ML: 200 INJECTION INTRAMUSCULAR at 00:00

## 2022-10-07 ENCOUNTER — APPOINTMENT (OUTPATIENT)
Dept: ENDOCRINOLOGY | Facility: CLINIC | Age: 87
End: 2022-10-07

## 2022-10-07 PROCEDURE — 96372 THER/PROPH/DIAG INJ SC/IM: CPT

## 2022-10-07 RX ORDER — TESTOSTERONE CYPIONATE 200 MG/ML
200 INJECTION, SOLUTION INTRAMUSCULAR
Refills: 0 | Status: COMPLETED | OUTPATIENT
Start: 2022-10-07

## 2022-10-07 RX ADMIN — TESTOSTERONE CYPIONATE 0 MG/ML: 200 INJECTION INTRAMUSCULAR at 00:00

## 2022-10-26 RX ORDER — LEVOTHYROXINE SODIUM 0.09 MG/1
88 TABLET ORAL
Qty: 90 | Refills: 0 | Status: ACTIVE | COMMUNITY
Start: 2022-08-04 | End: 1900-01-01

## 2022-11-21 ENCOUNTER — RX RENEWAL (OUTPATIENT)
Age: 87
End: 2022-11-21

## 2022-12-19 ENCOUNTER — APPOINTMENT (OUTPATIENT)
Dept: ENDOCRINOLOGY | Facility: CLINIC | Age: 87
End: 2022-12-19

## 2022-12-26 ENCOUNTER — RX RENEWAL (OUTPATIENT)
Age: 87
End: 2022-12-26

## 2023-02-21 NOTE — ASU DISCHARGE PLAN (ADULT/PEDIATRIC) - PROVIDER TOKENS
What Type Of Note Output Would You Prefer (Optional)?: Bullet Format Hpi Title: Evaluation of Skin Lesions PROVIDER:[TOKEN:[[id number]]]

## 2023-04-25 NOTE — PATIENT PROFILE ADULT - NSPRESCRUSEDDRG_GEN_A_NUR
[facility]  Sports Medicine  [address] [place] [address]  Phone: [phone]  Fax: